# Patient Record
Sex: FEMALE | HISPANIC OR LATINO | URBAN - METROPOLITAN AREA
[De-identification: names, ages, dates, MRNs, and addresses within clinical notes are randomized per-mention and may not be internally consistent; named-entity substitution may affect disease eponyms.]

---

## 2024-02-29 ENCOUNTER — APPOINTMENT (OUTPATIENT)
Dept: RADIOLOGY | Facility: IMAGING CENTER | Age: 29
End: 2024-02-29

## 2024-02-29 DIAGNOSIS — N91.2 AMENORRHEA: ICD-10-CM

## 2024-02-29 PROCEDURE — 76801 OB US < 14 WKS SINGLE FETUS: CPT | Mod: TC

## 2024-03-13 ENCOUNTER — APPOINTMENT (OUTPATIENT)
Dept: OBGYN | Facility: CLINIC | Age: 29
End: 2024-03-13

## 2024-03-13 ENCOUNTER — INITIAL PRENATAL (OUTPATIENT)
Dept: OBGYN | Facility: CLINIC | Age: 29
End: 2024-03-13

## 2024-03-13 ENCOUNTER — HOSPITAL ENCOUNTER (OUTPATIENT)
Facility: MEDICAL CENTER | Age: 29
End: 2024-03-13
Attending: NURSE PRACTITIONER
Payer: COMMERCIAL

## 2024-03-13 VITALS — DIASTOLIC BLOOD PRESSURE: 60 MMHG | SYSTOLIC BLOOD PRESSURE: 110 MMHG | WEIGHT: 133 LBS

## 2024-03-13 DIAGNOSIS — Z87.09 HISTORY OF ASTHMA: ICD-10-CM

## 2024-03-13 DIAGNOSIS — N94.2 VAGINISMUS: ICD-10-CM

## 2024-03-13 PROBLEM — Z34.00 PREGNANCY, SUPERVISION OF FIRST: Status: ACTIVE | Noted: 2024-03-13

## 2024-03-13 PROBLEM — N94.819 VULVODYNIA: Status: ACTIVE | Noted: 2024-03-13

## 2024-03-13 LAB
C TRACH DNA GENITAL QL NAA+PROBE: NEGATIVE
CANDIDA DNA VAG QL PROBE+SIG AMP: NEGATIVE
G VAGINALIS DNA VAG QL PROBE+SIG AMP: NEGATIVE
N GONORRHOEA DNA GENITAL QL NAA+PROBE: NEGATIVE
SPECIMEN SOURCE: NORMAL
T VAGINALIS DNA VAG QL PROBE+SIG AMP: NEGATIVE

## 2024-03-13 PROCEDURE — 0500F INITIAL PRENATAL CARE VISIT: CPT | Performed by: NURSE PRACTITIONER

## 2024-03-13 PROCEDURE — 3074F SYST BP LT 130 MM HG: CPT | Performed by: NURSE PRACTITIONER

## 2024-03-13 PROCEDURE — 3078F DIAST BP <80 MM HG: CPT | Performed by: NURSE PRACTITIONER

## 2024-03-13 PROCEDURE — 8198 PREG CTR PKG RATE (SYSTEM): Performed by: NURSE PRACTITIONER

## 2024-03-13 RX ORDER — PYRIDOXINE HCL (VITAMIN B6) 25 MG
25 TABLET ORAL EVERY 4 HOURS
Qty: 60 TABLET | Refills: 5 | Status: SHIPPED | OUTPATIENT
Start: 2024-03-13

## 2024-03-13 ASSESSMENT — EDINBURGH POSTNATAL DEPRESSION SCALE (EPDS)
I HAVE FELT SCARED OR PANICKY FOR NO GOOD REASON: NO, NOT AT ALL
I HAVE BEEN ABLE TO LAUGH AND SEE THE FUNNY SIDE OF THINGS: AS MUCH AS I ALWAYS COULD
TOTAL SCORE: 1
I HAVE BLAMED MYSELF UNNECESSARILY WHEN THINGS WENT WRONG: NO, NEVER
I HAVE LOOKED FORWARD WITH ENJOYMENT TO THINGS: AS MUCH AS I EVER DID
THINGS HAVE BEEN GETTING ON TOP OF ME: NO, MOST OF THE TIME I HAVE COPED QUITE WELL
I HAVE FELT SAD OR MISERABLE: NO, NOT AT ALL
I HAVE BEEN ANXIOUS OR WORRIED FOR NO GOOD REASON: NO, NOT AT ALL
I HAVE BEEN SO UNHAPPY THAT I HAVE HAD DIFFICULTY SLEEPING: NOT AT ALL
THE THOUGHT OF HARMING MYSELF HAS OCCURRED TO ME: NEVER
I HAVE BEEN SO UNHAPPY THAT I HAVE BEEN CRYING: NO, NEVER

## 2024-03-13 NOTE — PROGRESS NOTES
Subjective:   Susan Coyle is a 29 y.o.  who presents for her new OB exam.  She is 12w2d with an GAIL of Estimated Date of Delivery: 24 by US due to irregular periods. She is feeling well but some nausea. Denies VB, LOF, contractions or pain. No ER visits or previous care in this pregnancy. Denies dysuria, vaginal DC, fever. Reports no fetal movement. Declines AFP.  Declines CF.  Declines NIPT testing.     Past Medical History:   Diagnosis Date    Asthma     Head ache        Psych Hx: Patient denies any history of depression, anxiety, PTSD, bipolar or any other psychological issues.     EPDS completed    History reviewed. No pertinent surgical history.     OB History    Para Term  AB Living   1             SAB IAB Ectopic Molar Multiple Live Births                    # Outcome Date GA Lbr Brayan/2nd Weight Sex Delivery Anes PTL Lv   1 Current                 Gynecological Hx: Denies any hx of STIs, including HSV. Denies any vulvovaginal disorders and no hx of abnormal cervical cytology. Last pap n/a.    Sexual Hx: One current male partner, who is FOB     Contraceptive Hx: Has not used in the past and has since discontinued use.     Family History   Problem Relation Age of Onset    Stomach Cancer Paternal Grandmother      Denies any genetic disorders in family history.     Social History     Socioeconomic History    Marital status: Single     Spouse name: Not on file    Number of children: Not on file    Years of education: Not on file    Highest education level: Not on file   Occupational History    Not on file   Tobacco Use    Smoking status: Never    Smokeless tobacco: Never   Vaping Use    Vaping Use: Never used   Substance and Sexual Activity    Alcohol use: Never    Drug use: Never    Sexual activity: Not Currently     Partners: Male     Birth control/protection: None   Other Topics Concern    Not on file   Social History Narrative    Not on file     Social Determinants of Health      Financial Resource Strain: Not on file   Food Insecurity: Not on file   Transportation Needs: Not on file   Physical Activity: Not on file   Stress: Not on file   Social Connections: Not on file   Intimate Partner Violence: Not on file   Housing Stability: Not on file       FOB is involved and does not lives with Susan Coyle.  She lives with her cousin. Pregnancy is unplanned but desired.    She is currently not working, denies any heavy lifting or exposure to potential teratogens like environmental or occupational toxins.   Denies alcohol use, drug use, or tobacco use in pregnancy.   Denies any current or hx of sexual, emotional or physical abuse or trauma.     Current Medications: PNV   Allergies: Denies allergies to medications, food, or environmental allergies.     Objective:      Vitals:    03/13/24 0835   BP: 110/60   Weight: 133 lb        See Prenatal Physical and Prenatal Vitals      Assessment:      1.  IUP @ 12w2d per US      2.  S=D      3.  See problem list as follows       Patient Active Problem List    Diagnosis Date Noted    Supervision of first pregnancy 03/13/2024         Plan:   - GC/CT/vaginal pathogens collected but pt VERY sensitive to swab on exterior vaginal area, reports first and only time having sex was conceiving this pregnancy; desires pap PP  - B6 and unisom rx sent in  - No more flu vacc in clinic, can get at local pharmacy   - Prenatal labs ordered - lab slip given  - Discussed PNV, nutrition, adequate water intake, and exercise/weight gain in pregnancy  - NOB informational packet with anticipatory guidance given  - Information on Centering Pregnancy given, n/a  - S/sx of pregnancy warning signs and PTL precautions given  - Complete OB US in 8 wks  - Return to [facility] in 4 wks

## 2024-03-13 NOTE — PROGRESS NOTES
Patient here for NEW OB   LMP= 11/8/23 exact   GAIL= 9/23/24  GA= 12w2d  Last pap-  pt would like to do it at her PP appt.   Phone number: [phone] (home)   Pharmacy verified  EPDS- 1  Pt declines genetic testing

## 2024-04-10 ENCOUNTER — ROUTINE PRENATAL (OUTPATIENT)
Dept: OBGYN | Facility: CLINIC | Age: 29
End: 2024-04-10

## 2024-04-10 VITALS
SYSTOLIC BLOOD PRESSURE: 108 MMHG | HEIGHT: 60 IN | BODY MASS INDEX: 26.39 KG/M2 | DIASTOLIC BLOOD PRESSURE: 60 MMHG | WEIGHT: 134.4 LBS

## 2024-04-10 DIAGNOSIS — Z34.03 ENCOUNTER FOR PRENATAL CARE IN THIRD TRIMESTER OF FIRST PREGNANCY: ICD-10-CM

## 2024-04-10 PROCEDURE — 3078F DIAST BP <80 MM HG: CPT | Performed by: PHYSICIAN ASSISTANT

## 2024-04-10 PROCEDURE — 0502F SUBSEQUENT PRENATAL CARE: CPT | Performed by: PHYSICIAN ASSISTANT

## 2024-04-10 PROCEDURE — 3074F SYST BP LT 130 MM HG: CPT | Performed by: PHYSICIAN ASSISTANT

## 2024-04-10 RX ORDER — LANOLIN ALCOHOL/MO/W.PET/CERES
400 CREAM (GRAM) TOPICAL DAILY
Qty: 30 TABLET | Refills: 5 | Status: SHIPPED | OUTPATIENT
Start: 2024-04-10

## 2024-04-10 NOTE — PROGRESS NOTES
Pt. Here for OB/FU.   U/S on 5/8/2024  Good # [phone] (home)   Pt states still having nausea.   Pt states will do PNP today declines genetic testing, wasn't aware she needed to do before, pt would like to know if she could get a prescription for folic acid.

## 2024-04-10 NOTE — LETTER
April 10, 2024            Susan Coyle is pregnant with an estimated delivery date of 9/23/24 at this time.        Thank you,          LOREN Saavedra.

## 2024-04-10 NOTE — PROGRESS NOTES
Pt has no complaint with cramping, bleeding or pain, though pt has continued N/V. Pt states it is difficult to eat or drink water, only gained 1 lb since last visit. +FM but little. GC/CT neg, pt aware, pt was unaware she was supposed to do labs - will do asap. Declines AFP or NIPT.  5/8. Pt given info for OTC N/V remedies, will call if not improving. RTC 4 wk or sooner prn. Info given for WIC.

## 2024-04-16 ENCOUNTER — APPOINTMENT (OUTPATIENT)
Dept: OBGYN | Facility: CLINIC | Age: 29
End: 2024-04-16

## 2024-04-25 ENCOUNTER — HOSPITAL ENCOUNTER (OUTPATIENT)
Dept: LAB | Facility: MEDICAL CENTER | Age: 29
End: 2024-04-25
Attending: NURSE PRACTITIONER
Payer: COMMERCIAL

## 2024-04-25 DIAGNOSIS — Z87.09 HISTORY OF ASTHMA: ICD-10-CM

## 2024-04-25 LAB
ABO GROUP BLD: NORMAL
BLD GP AB SCN SERPL QL: NORMAL
RH BLD: NORMAL

## 2024-04-26 LAB
ERYTHROCYTE [DISTWIDTH] IN BLOOD BY AUTOMATED COUNT: 52.3 FL (ref 35.9–50)
HBV SURFACE AG SER QL: ABNORMAL
HCT VFR BLD AUTO: 40.7 % (ref 37–47)
HCV AB SER QL: ABNORMAL
HGB BLD-MCNC: 13.4 G/DL (ref 12–16)
HIV 1+2 AB+HIV1 P24 AG SERPL QL IA: NORMAL
MCH RBC QN AUTO: 32.8 PG (ref 27–33)
MCHC RBC AUTO-ENTMCNC: 32.9 G/DL (ref 32.2–35.5)
MCV RBC AUTO: 99.8 FL (ref 81.4–97.8)
PLATELET # BLD AUTO: 302 K/UL (ref 164–446)
PMV BLD AUTO: 10.4 FL (ref 9–12.9)
RBC # BLD AUTO: 4.08 M/UL (ref 4.2–5.4)
RUBV AB SER QL: 175 IU/ML
T PALLIDUM AB SER QL IA: ABNORMAL
WBC # BLD AUTO: 9.5 K/UL (ref 4.8–10.8)

## 2024-04-27 LAB
AMPHET CTO UR CFM-MCNC: NEGATIVE NG/ML
BACTERIA UR CULT: NORMAL
BARBITURATES CTO UR CFM-MCNC: NEGATIVE NG/ML
BENZODIAZ CTO UR CFM-MCNC: NEGATIVE NG/ML
CANNABINOIDS CTO UR CFM-MCNC: NEGATIVE NG/ML
COCAINE CTO UR CFM-MCNC: NEGATIVE NG/ML
CREAT UR-MCNC: 124 MG/DL (ref 20–400)
DRUG COMMENT 753798: NORMAL
METHADONE CTO UR CFM-MCNC: NEGATIVE NG/ML
OPIATES CTO UR CFM-MCNC: NEGATIVE NG/ML
PCP CTO UR CFM-MCNC: NEGATIVE NG/ML
PROPOXYPH CTO UR CFM-MCNC: NEGATIVE NG/ML
SIGNIFICANT IND 70042: NORMAL
SITE SITE: NORMAL
SOURCE SOURCE: NORMAL

## 2024-04-30 ENCOUNTER — TELEPHONE (OUTPATIENT)
Dept: MEDICAL GROUP | Facility: IMAGING CENTER | Age: 29
End: 2024-04-30

## 2024-04-30 NOTE — TELEPHONE ENCOUNTER
Caller Name: Susan Coyle   Call Back Number: [phone] (home)      How would the patient prefer to be contacted with a response: Phone call do NOT leave a detailed message    Pt called in regards to the pay check stubs that were requested. She states she was unable to get two paycheck stubs from the month of March. All documents are uploaded under media. She would like a call back. Please advise.   Thank you

## 2024-04-30 NOTE — TELEPHONE ENCOUNTER
Called and let pt know. She had the wrong office and meant to reach nivia bryan. Disregard request. Thank you

## 2024-05-09 ENCOUNTER — APPOINTMENT (OUTPATIENT)
Dept: OBGYN | Facility: CLINIC | Age: 29
End: 2024-05-09

## 2024-05-09 ENCOUNTER — APPOINTMENT (OUTPATIENT)
Dept: RADIOLOGY | Facility: IMAGING CENTER | Age: 29
End: 2024-05-09
Attending: NURSE PRACTITIONER

## 2024-05-09 VITALS — SYSTOLIC BLOOD PRESSURE: 90 MMHG | BODY MASS INDEX: 26.86 KG/M2 | WEIGHT: 136.8 LBS | DIASTOLIC BLOOD PRESSURE: 60 MMHG

## 2024-05-09 DIAGNOSIS — Z34.03 ENCOUNTER FOR PRENATAL CARE IN THIRD TRIMESTER OF FIRST PREGNANCY: ICD-10-CM

## 2024-05-09 DIAGNOSIS — Z87.09 HISTORY OF ASTHMA: ICD-10-CM

## 2024-05-09 PROCEDURE — 3074F SYST BP LT 130 MM HG: CPT | Performed by: PHYSICIAN ASSISTANT

## 2024-05-09 PROCEDURE — 0502F SUBSEQUENT PRENATAL CARE: CPT | Performed by: PHYSICIAN ASSISTANT

## 2024-05-09 PROCEDURE — 76805 OB US >/= 14 WKS SNGL FETUS: CPT | Mod: TC | Performed by: NURSE PRACTITIONER

## 2024-05-09 PROCEDURE — 3078F DIAST BP <80 MM HG: CPT | Performed by: PHYSICIAN ASSISTANT

## 2024-05-09 NOTE — PROGRESS NOTES
Pt. Here for OB/FU. Reports Good FM.   Good # [phone] (home)   U/S done today @ 0900 am.  Pt states still having nausea on occasion.

## 2024-05-09 NOTE — PROGRESS NOTES
Pt hs no complaints with cramping, bleeding or pain. +FM. US done today - wnl, prelim results d/w pt. PNL also wnl - pt aware. 1hr GTT next visit. RTC 4 wk or sooner prn.

## 2024-05-17 PROBLEM — Q27.0 TWO VESSEL CORD: Status: ACTIVE | Noted: 2024-05-17

## 2024-06-03 ENCOUNTER — APPOINTMENT (OUTPATIENT)
Dept: OBGYN | Facility: CLINIC | Age: 29
End: 2024-06-03
Payer: MEDICAID

## 2024-06-10 ENCOUNTER — ROUTINE PRENATAL (OUTPATIENT)
Dept: OBGYN | Facility: CLINIC | Age: 29
End: 2024-06-10

## 2024-06-10 VITALS — SYSTOLIC BLOOD PRESSURE: 102 MMHG | BODY MASS INDEX: 27.88 KG/M2 | DIASTOLIC BLOOD PRESSURE: 61 MMHG | WEIGHT: 142 LBS

## 2024-06-10 DIAGNOSIS — Z34.02 ENCOUNTER FOR SUPERVISION OF NORMAL FIRST PREGNANCY IN SECOND TRIMESTER: ICD-10-CM

## 2024-06-10 PROBLEM — Q27.0 TWO VESSEL CORD: Status: RESOLVED | Noted: 2024-05-17 | Resolved: 2024-06-10

## 2024-06-10 PROCEDURE — 3074F SYST BP LT 130 MM HG: CPT

## 2024-06-10 PROCEDURE — 3078F DIAST BP <80 MM HG: CPT

## 2024-06-10 PROCEDURE — 0502F SUBSEQUENT PRENATAL CARE: CPT

## 2024-06-10 NOTE — PROGRESS NOTES
OB follow up   Pt states no questions or concerns   + fetal movement.  No VB, LOF or UC's.  Phone # [phone]   Preferred pharmacy confirmed.   [id number]     Post-Care Instructions: I reviewed with the patient in detail post-care instructions. Patient is to wear sunprotection, and avoid picking at any of the treated lesions. Pt may apply Vaseline to crusted or scabbing areas. Duration Of Freeze Thaw-Cycle (Seconds): 0 Render Note In Bullet Format When Appropriate: No Consent: The patient's consent was obtained including but not limited to risks of crusting, scabbing, blistering, scarring, darker or lighter pigmentary change, recurrence, incomplete removal and infection. Show Applicator Variable?: Yes Detail Level: Detailed

## 2024-06-10 NOTE — PROGRESS NOTES
S:  Pt is  at 25w0d for routine OB follow up. She has no concerns today. No ED or hospital visits since last seen. Reports good FM. Denies VB, LOF, RUCs or vaginal DC.    O:  See above for vitals        OB US 24 - WNL except 3 vessel view not seen     A:  IUP at 25w0d  Patient Active Problem List    Diagnosis Date Noted    Supervision of first pregnancy 2024    History of childhood asthma 2024    Vulvodynia 2024        P:  1.  Nutrition/diet discussed,         2.  Questions answered.          3.  Encourage adequate water intake.        4.   labor precautions reviewed.         5.  1 hr GTT, CBC and T pallidum ordered. Instructions and lab slip given to pt.        6.  Repeat OB US for 3 vessel view ordered         7.  F/u 3 wks.

## 2024-06-26 ENCOUNTER — HOSPITAL ENCOUNTER (OUTPATIENT)
Dept: LAB | Facility: MEDICAL CENTER | Age: 29
End: 2024-06-26
Payer: COMMERCIAL

## 2024-06-26 DIAGNOSIS — Z34.02 ENCOUNTER FOR SUPERVISION OF NORMAL FIRST PREGNANCY IN SECOND TRIMESTER: ICD-10-CM

## 2024-06-26 LAB
BASOPHILS # BLD AUTO: 0.6 % (ref 0–1.8)
BASOPHILS # BLD: 0.06 K/UL (ref 0–0.12)
EOSINOPHIL # BLD AUTO: 0.2 K/UL (ref 0–0.51)
EOSINOPHIL NFR BLD: 1.9 % (ref 0–6.9)
ERYTHROCYTE [DISTWIDTH] IN BLOOD BY AUTOMATED COUNT: 55.4 FL (ref 35.9–50)
GLUCOSE 1H P 50 G GLC PO SERPL-MCNC: 119 MG/DL (ref 70–139)
HCT VFR BLD AUTO: 40 % (ref 37–47)
HGB BLD-MCNC: 13 G/DL (ref 12–16)
IMM GRANULOCYTES # BLD AUTO: 0.13 K/UL (ref 0–0.11)
IMM GRANULOCYTES NFR BLD AUTO: 1.2 % (ref 0–0.9)
LYMPHOCYTES # BLD AUTO: 2.92 K/UL (ref 1–4.8)
LYMPHOCYTES NFR BLD: 27.4 % (ref 22–41)
MCH RBC QN AUTO: 33.1 PG (ref 27–33)
MCHC RBC AUTO-ENTMCNC: 32.5 G/DL (ref 32.2–35.5)
MCV RBC AUTO: 101.8 FL (ref 81.4–97.8)
MONOCYTES # BLD AUTO: 0.7 K/UL (ref 0–0.85)
MONOCYTES NFR BLD AUTO: 6.6 % (ref 0–13.4)
NEUTROPHILS # BLD AUTO: 6.64 K/UL (ref 1.82–7.42)
NEUTROPHILS NFR BLD: 62.3 % (ref 44–72)
NRBC # BLD AUTO: 0 K/UL
NRBC BLD-RTO: 0 /100 WBC (ref 0–0.2)
PLATELET # BLD AUTO: 255 K/UL (ref 164–446)
PMV BLD AUTO: 10.7 FL (ref 9–12.9)
RBC # BLD AUTO: 3.93 M/UL (ref 4.2–5.4)
T PALLIDUM AB SER QL IA: NORMAL
WBC # BLD AUTO: 10.7 K/UL (ref 4.8–10.8)

## 2024-07-08 ENCOUNTER — ROUTINE PRENATAL (OUTPATIENT)
Dept: OBGYN | Facility: CLINIC | Age: 29
End: 2024-07-08

## 2024-07-08 VITALS — WEIGHT: 144.2 LBS | SYSTOLIC BLOOD PRESSURE: 104 MMHG | BODY MASS INDEX: 28.31 KG/M2 | DIASTOLIC BLOOD PRESSURE: 64 MMHG

## 2024-07-08 DIAGNOSIS — Z34.03 ENCOUNTER FOR SUPERVISION OF NORMAL FIRST PREGNANCY IN THIRD TRIMESTER: ICD-10-CM

## 2024-07-08 DIAGNOSIS — Z3A.29 29 WEEKS GESTATION OF PREGNANCY: ICD-10-CM

## 2024-07-08 PROCEDURE — 0502F SUBSEQUENT PRENATAL CARE: CPT | Performed by: OBSTETRICS & GYNECOLOGY

## 2024-07-08 PROCEDURE — 3078F DIAST BP <80 MM HG: CPT | Performed by: OBSTETRICS & GYNECOLOGY

## 2024-07-08 PROCEDURE — 3074F SYST BP LT 130 MM HG: CPT | Performed by: OBSTETRICS & GYNECOLOGY

## 2024-07-22 ENCOUNTER — ROUTINE PRENATAL (OUTPATIENT)
Dept: OBGYN | Facility: CLINIC | Age: 29
End: 2024-07-22

## 2024-07-22 VITALS — SYSTOLIC BLOOD PRESSURE: 100 MMHG | WEIGHT: 147 LBS | DIASTOLIC BLOOD PRESSURE: 60 MMHG | BODY MASS INDEX: 28.86 KG/M2

## 2024-07-22 DIAGNOSIS — Z34.03 ENCOUNTER FOR PRENATAL CARE IN THIRD TRIMESTER OF FIRST PREGNANCY: ICD-10-CM

## 2024-07-22 PROCEDURE — 0502F SUBSEQUENT PRENATAL CARE: CPT

## 2024-07-22 PROCEDURE — 3078F DIAST BP <80 MM HG: CPT

## 2024-07-22 PROCEDURE — 3074F SYST BP LT 130 MM HG: CPT

## 2024-08-12 ENCOUNTER — ROUTINE PRENATAL (OUTPATIENT)
Dept: OBGYN | Facility: CLINIC | Age: 29
End: 2024-08-12

## 2024-08-12 VITALS — SYSTOLIC BLOOD PRESSURE: 100 MMHG | BODY MASS INDEX: 29.45 KG/M2 | DIASTOLIC BLOOD PRESSURE: 71 MMHG | WEIGHT: 150 LBS

## 2024-08-12 DIAGNOSIS — Z34.03 ENCOUNTER FOR SUPERVISION OF NORMAL FIRST PREGNANCY IN THIRD TRIMESTER: ICD-10-CM

## 2024-08-12 PROCEDURE — 0502F SUBSEQUENT PRENATAL CARE: CPT

## 2024-08-12 PROCEDURE — 3074F SYST BP LT 130 MM HG: CPT

## 2024-08-12 PROCEDURE — 3078F DIAST BP <80 MM HG: CPT

## 2024-08-12 NOTE — PROGRESS NOTES
Inserted through 6F standard sheath Pt here for a OB follow up   GA: 34w 0d  Pt states: feet are swelling   + fetal movement.  No VB, LOF, UC's.  Phone # [phone]   Pharmacy Verified.    : [id number]

## 2024-08-29 ENCOUNTER — ROUTINE PRENATAL (OUTPATIENT)
Dept: OBGYN | Facility: CLINIC | Age: 29
End: 2024-08-29
Payer: MEDICAID

## 2024-08-29 ENCOUNTER — HOSPITAL ENCOUNTER (OUTPATIENT)
Facility: MEDICAL CENTER | Age: 29
End: 2024-08-29
Attending: STUDENT IN AN ORGANIZED HEALTH CARE EDUCATION/TRAINING PROGRAM
Payer: MEDICAID

## 2024-08-29 VITALS — SYSTOLIC BLOOD PRESSURE: 136 MMHG | WEIGHT: 154.2 LBS | DIASTOLIC BLOOD PRESSURE: 82 MMHG | BODY MASS INDEX: 30.27 KG/M2

## 2024-08-29 DIAGNOSIS — Z34.03 NORMAL FIRST PREGNANCY CONFIRMED, CURRENTLY IN THIRD TRIMESTER: ICD-10-CM

## 2024-08-29 DIAGNOSIS — Z3A.36 36 WEEKS GESTATION OF PREGNANCY: ICD-10-CM

## 2024-08-29 PROCEDURE — 87081 CULTURE SCREEN ONLY: CPT

## 2024-08-29 PROCEDURE — 87150 DNA/RNA AMPLIFIED PROBE: CPT

## 2024-08-29 NOTE — PROGRESS NOTES
OB Visit Note - 36w3d     Pregnancy complicated by:  Patient Active Problem List   Diagnosis    Supervision of first pregnancy    History of childhood asthma    Vulvodynia         SUBJECTIVE:  Susan Coyle is a 29 y.o.,  at 36w3d who presents for pregnancy follow-up. She states the baby is moving.     ROS:  She denies vaginal bleeding, leakage of fluid, or contractions.    She denies nausea or vomiting or headache    OBJECTIVE:  Vital Signs: /82   Wt 154 lb 3.2 oz   LMP 2023 (Approximate)   BMI 30.27 kg/m²   Appearance/Psychiatric: to be in no distress  Constitutional: The patient is well nourished.  Respiratory:Respiratory effort is normal.  Gastrointestinal: Abdomen is soft, gravid, non-tendern,no rashes, heart tones are present, fundal height is consistent with dates  Extremities: no edema  Limited TAUS: cephalic  FH 37    GBS collected    1. Normal first pregnancy confirmed, currently in third trimester          Susan Coyle is a 29 y.o. female  at 36w3d. Doing well. Cephalic by US. GBS collected      The patient will follow up in 1 week(s). She was counseled to call or return for vaginal bleeding, regular contractions, leakage of fluid or decreased fetal movement.  Stacie Jules DO, FACOG  [facility]

## 2024-08-29 NOTE — PROGRESS NOTES
Pt's here for OB follow-up  Reports + fetal movement good/active  No VB, LOF or UC's.  Confirmed good ph#[phone]    Pharmacy, drug allergy, and medications on file.  Pt states no complaints for today.  GBS Today  Declines TDAP today

## 2024-08-30 LAB — GP B STREP DNA SPEC QL NAA+PROBE: NEGATIVE

## 2024-09-12 ENCOUNTER — ROUTINE PRENATAL (OUTPATIENT)
Dept: OBGYN | Facility: CLINIC | Age: 29
End: 2024-09-12

## 2024-09-12 VITALS — SYSTOLIC BLOOD PRESSURE: 122 MMHG | BODY MASS INDEX: 31.73 KG/M2 | DIASTOLIC BLOOD PRESSURE: 80 MMHG | WEIGHT: 161.6 LBS

## 2024-09-12 DIAGNOSIS — Z34.03 ENCOUNTER FOR PRENATAL CARE IN THIRD TRIMESTER OF FIRST PREGNANCY: ICD-10-CM

## 2024-09-12 PROCEDURE — 3079F DIAST BP 80-89 MM HG: CPT | Performed by: PHYSICIAN ASSISTANT

## 2024-09-12 PROCEDURE — 0502F SUBSEQUENT PRENATAL CARE: CPT | Performed by: PHYSICIAN ASSISTANT

## 2024-09-12 PROCEDURE — 3074F SYST BP LT 130 MM HG: CPT | Performed by: PHYSICIAN ASSISTANT

## 2024-09-12 NOTE — PROGRESS NOTES
Pt has no complaints with cramping, UCs, VB, LOF. +FM. GBS neg. Labor precautions reviewed. US confirms vtx. IOL next visit if not in labor. Pt declines cervical exam today. RTC 1 wk or sooner prn.

## 2024-09-12 NOTE — PROGRESS NOTES
Pt. Here for OB/FU. Reports Good FM.   Good # [phone] (home)   Pt states has been having cramping that comes and goes.   GBS negative

## 2024-09-20 ENCOUNTER — ROUTINE PRENATAL (OUTPATIENT)
Dept: OBGYN | Facility: CLINIC | Age: 29
End: 2024-09-20

## 2024-09-20 ENCOUNTER — HOSPITAL ENCOUNTER (OUTPATIENT)
Dept: LAB | Facility: MEDICAL CENTER | Age: 29
End: 2024-09-20
Payer: COMMERCIAL

## 2024-09-20 ENCOUNTER — HOSPITAL ENCOUNTER (INPATIENT)
Facility: MEDICAL CENTER | Age: 29
LOS: 3 days | End: 2024-09-23
Attending: OBSTETRICS & GYNECOLOGY | Admitting: OBSTETRICS & GYNECOLOGY
Payer: MEDICAID

## 2024-09-20 VITALS — BODY MASS INDEX: 31.8 KG/M2 | SYSTOLIC BLOOD PRESSURE: 132 MMHG | WEIGHT: 162 LBS | DIASTOLIC BLOOD PRESSURE: 90 MMHG

## 2024-09-20 DIAGNOSIS — O16.3 ELEVATED BLOOD PRESSURE AFFECTING PREGNANCY IN THIRD TRIMESTER, ANTEPARTUM: ICD-10-CM

## 2024-09-20 DIAGNOSIS — Z34.03 ENCOUNTER FOR PRENATAL CARE IN THIRD TRIMESTER OF FIRST PREGNANCY: ICD-10-CM

## 2024-09-20 PROBLEM — Z34.90 ENCOUNTER FOR INDUCTION OF LABOR: Status: ACTIVE | Noted: 2024-09-20

## 2024-09-20 LAB
ALBUMIN SERPL BCP-MCNC: 3.5 G/DL (ref 3.2–4.9)
ALBUMIN/GLOB SERPL: 1.1 G/DL
ALP SERPL-CCNC: 123 U/L (ref 30–99)
ALT SERPL-CCNC: 11 U/L (ref 2–50)
ANION GAP SERPL CALC-SCNC: 12 MMOL/L (ref 7–16)
AST SERPL-CCNC: 24 U/L (ref 12–45)
BILIRUB SERPL-MCNC: 0.2 MG/DL (ref 0.1–1.5)
BUN SERPL-MCNC: 11 MG/DL (ref 8–22)
CALCIUM ALBUM COR SERPL-MCNC: 9.4 MG/DL (ref 8.5–10.5)
CALCIUM SERPL-MCNC: 9 MG/DL (ref 8.5–10.5)
CHLORIDE SERPL-SCNC: 103 MMOL/L (ref 96–112)
CO2 SERPL-SCNC: 20 MMOL/L (ref 20–33)
CREAT SERPL-MCNC: 0.64 MG/DL (ref 0.5–1.4)
CREAT UR-MCNC: 174 MG/DL
GFR SERPLBLD CREATININE-BSD FMLA CKD-EPI: 121 ML/MIN/1.73 M 2
GLOBULIN SER CALC-MCNC: 3.2 G/DL (ref 1.9–3.5)
GLUCOSE SERPL-MCNC: 90 MG/DL (ref 65–99)
LDH SERPL L TO P-CCNC: 229 U/L (ref 107–266)
POTASSIUM SERPL-SCNC: 4.2 MMOL/L (ref 3.6–5.5)
PROT SERPL-MCNC: 6.7 G/DL (ref 6–8.2)
PROT UR-MCNC: 67.1 MG/DL (ref 0–15)
PROT/CREAT UR: 386 MG/G (ref 10–107)
SODIUM SERPL-SCNC: 135 MMOL/L (ref 135–145)

## 2024-09-20 PROCEDURE — 700105 HCHG RX REV CODE 258: Performed by: OBSTETRICS & GYNECOLOGY

## 2024-09-20 PROCEDURE — 0502F SUBSEQUENT PRENATAL CARE: CPT

## 2024-09-20 PROCEDURE — 3075F SYST BP GE 130 - 139MM HG: CPT

## 2024-09-20 PROCEDURE — A9270 NON-COVERED ITEM OR SERVICE: HCPCS | Performed by: NURSE PRACTITIONER

## 2024-09-20 PROCEDURE — 700111 HCHG RX REV CODE 636 W/ 250 OVERRIDE (IP): Performed by: OBSTETRICS & GYNECOLOGY

## 2024-09-20 PROCEDURE — 700102 HCHG RX REV CODE 250 W/ 637 OVERRIDE(OP): Performed by: NURSE PRACTITIONER

## 2024-09-20 PROCEDURE — 36415 COLL VENOUS BLD VENIPUNCTURE: CPT

## 2024-09-20 PROCEDURE — 770002 HCHG ROOM/CARE - OB PRIVATE (112)

## 2024-09-20 PROCEDURE — 3080F DIAST BP >= 90 MM HG: CPT

## 2024-09-20 RX ORDER — ACETAMINOPHEN 500 MG
1000 TABLET ORAL
Status: DISCONTINUED | OUTPATIENT
Start: 2024-09-20 | End: 2024-09-21 | Stop reason: HOSPADM

## 2024-09-20 RX ORDER — LIDOCAINE HYDROCHLORIDE 10 MG/ML
20 INJECTION, SOLUTION INFILTRATION; PERINEURAL
Status: DISCONTINUED | OUTPATIENT
Start: 2024-09-20 | End: 2024-09-21 | Stop reason: HOSPADM

## 2024-09-20 RX ORDER — TERBUTALINE SULFATE 1 MG/ML
0.25 INJECTION, SOLUTION SUBCUTANEOUS
Status: DISCONTINUED | OUTPATIENT
Start: 2024-09-20 | End: 2024-09-21 | Stop reason: HOSPADM

## 2024-09-20 RX ORDER — ACETAMINOPHEN 500 MG
1000 TABLET ORAL EVERY 6 HOURS PRN
Status: DISCONTINUED | OUTPATIENT
Start: 2024-09-20 | End: 2024-09-21

## 2024-09-20 RX ORDER — IBUPROFEN 800 MG/1
800 TABLET, FILM COATED ORAL
Status: DISCONTINUED | OUTPATIENT
Start: 2024-09-20 | End: 2024-09-21 | Stop reason: HOSPADM

## 2024-09-20 RX ORDER — OXYTOCIN 10 [USP'U]/ML
10 INJECTION, SOLUTION INTRAMUSCULAR; INTRAVENOUS
Status: DISCONTINUED | OUTPATIENT
Start: 2024-09-20 | End: 2024-09-21 | Stop reason: HOSPADM

## 2024-09-20 RX ORDER — SODIUM CHLORIDE, SODIUM LACTATE, POTASSIUM CHLORIDE, CALCIUM CHLORIDE 600; 310; 30; 20 MG/100ML; MG/100ML; MG/100ML; MG/100ML
INJECTION, SOLUTION INTRAVENOUS CONTINUOUS
Status: DISCONTINUED | OUTPATIENT
Start: 2024-09-20 | End: 2024-09-21 | Stop reason: ALTCHOICE

## 2024-09-20 RX ADMIN — ACETAMINOPHEN 1000 MG: 500 TABLET ORAL at 20:51

## 2024-09-20 RX ADMIN — OXYTOCIN 2 MILLI-UNITS/MIN: 10 INJECTION, SOLUTION INTRAMUSCULAR; INTRAVENOUS at 20:18

## 2024-09-20 RX ADMIN — SODIUM CHLORIDE, POTASSIUM CHLORIDE, SODIUM LACTATE AND CALCIUM CHLORIDE: 600; 310; 30; 20 INJECTION, SOLUTION INTRAVENOUS at 20:17

## 2024-09-20 SDOH — ECONOMIC STABILITY: TRANSPORTATION INSECURITY
IN THE PAST 12 MONTHS, HAS THE LACK OF TRANSPORTATION KEPT YOU FROM MEDICAL APPOINTMENTS OR FROM GETTING MEDICATIONS?: NO

## 2024-09-20 SDOH — ECONOMIC STABILITY: TRANSPORTATION INSECURITY
IN THE PAST 12 MONTHS, HAS LACK OF RELIABLE TRANSPORTATION KEPT YOU FROM MEDICAL APPOINTMENTS, MEETINGS, WORK OR FROM GETTING THINGS NEEDED FOR DAILY LIVING?: NO

## 2024-09-20 ASSESSMENT — LIFESTYLE VARIABLES
AVERAGE NUMBER OF DAYS PER WEEK YOU HAVE A DRINK CONTAINING ALCOHOL: 0
HOW MANY TIMES IN THE PAST YEAR HAVE YOU HAD 5 OR MORE DRINKS IN A DAY: 0
ALCOHOL_USE: NO
DOES PATIENT WANT TO STOP DRINKING: NO
TOTAL SCORE: 0
HAVE YOU EVER FELT YOU SHOULD CUT DOWN ON YOUR DRINKING: NO
EVER HAD A DRINK FIRST THING IN THE MORNING TO STEADY YOUR NERVES TO GET RID OF A HANGOVER: NO
CONSUMPTION TOTAL: NEGATIVE
ON A TYPICAL DAY WHEN YOU DRINK ALCOHOL HOW MANY DRINKS DO YOU HAVE: 0
HAVE PEOPLE ANNOYED YOU BY CRITICIZING YOUR DRINKING: NO
TOTAL SCORE: 0
TOTAL SCORE: 0
EVER FELT BAD OR GUILTY ABOUT YOUR DRINKING: NO

## 2024-09-20 ASSESSMENT — SOCIAL DETERMINANTS OF HEALTH (SDOH)
WITHIN THE PAST 12 MONTHS, YOU WORRIED THAT YOUR FOOD WOULD RUN OUT BEFORE YOU GOT THE MONEY TO BUY MORE: NEVER TRUE
WITHIN THE LAST YEAR, HAVE YOU BEEN AFRAID OF YOUR PARTNER OR EX-PARTNER?: NO
IN THE PAST 12 MONTHS, HAS THE ELECTRIC, GAS, OIL, OR WATER COMPANY THREATENED TO SHUT OFF SERVICE IN YOUR HOME?: NO
WITHIN THE LAST YEAR, HAVE YOU BEEN HUMILIATED OR EMOTIONALLY ABUSED IN OTHER WAYS BY YOUR PARTNER OR EX-PARTNER?: NO
WITHIN THE PAST 12 MONTHS, THE FOOD YOU BOUGHT JUST DIDN'T LAST AND YOU DIDN'T HAVE MONEY TO GET MORE: NEVER TRUE
WITHIN THE LAST YEAR, HAVE YOU BEEN KICKED, HIT, SLAPPED, OR OTHERWISE PHYSICALLY HURT BY YOUR PARTNER OR EX-PARTNER?: NO
WITHIN THE LAST YEAR, HAVE TO BEEN RAPED OR FORCED TO HAVE ANY KIND OF SEXUAL ACTIVITY BY YOUR PARTNER OR EX-PARTNER?: NO

## 2024-09-20 ASSESSMENT — PATIENT HEALTH QUESTIONNAIRE - PHQ9
2. FEELING DOWN, DEPRESSED, IRRITABLE, OR HOPELESS: NOT AT ALL
1. LITTLE INTEREST OR PLEASURE IN DOING THINGS: NOT AT ALL
SUM OF ALL RESPONSES TO PHQ9 QUESTIONS 1 AND 2: 0

## 2024-09-20 ASSESSMENT — PAIN DESCRIPTION - PAIN TYPE: TYPE: ACUTE PAIN

## 2024-09-20 ASSESSMENT — FIBROSIS 4 INDEX: FIB4 SCORE: 0.88

## 2024-09-20 NOTE — PROGRESS NOTES
S: Pt is a 29 y.o.  at 39w4d gestation here today for routine prenatal care. Pt reports fetal movement; denies cramping, vaginal bleeding, and leaking of fluid. Denies HA, blurry vision, or RUQ pain. Does not want SVE today.    O: BP (!) 132/90   Wt 162 lb    *see prenatal flowsheet*     Repeat BP: 132/90    A: IUP at 39w4d       S=D         Patient Active Problem List    Diagnosis Date Noted    Supervision of first pregnancy 2024    History of childhood asthma 2024    Vulvodynia 2024       P:   -Discussed normal s/sx pregnancy appropriate for gestational age general discomforts vs warning signs that necessitate evaluation in OB triage. Reviewed fetal movements appropriate for EGA. Reinforced adequate hydration and nutrition.  -Discussed signs and symptoms of preeclampsia, including HA that is not resolved by rest/Tylenol/hydration, changes in vision, RUQ pain, or sudden increase in swelling.   -Discussed labor precautions, including 5-1-1 rule. Instructed to report to hospital with leaking of fluid, concerns for decreased FM, excessive vaginal bleeding.   -Labs ordered today: CMP, PCR, LDH ordered STAT. Pt returned to clinic and repeat BP was normal (136/86)--will call with lab results if f/u is indicated.   -RTC in 1 weeks for routine prenatal care      Bruna Kay C.N.M.        1300: Labs resulted, PCR elevated and pt has dx of preeclampsia. Pt called, instructed to report to L&D for IOL today. Pt v/u and report called to MD Gabriele and charge RN.

## 2024-09-20 NOTE — H&P
OB H&P:    CC: IOL, Preeclampsia w/out SF    HPI:  Ms. Susan Coyle is a 29 y.o.  @ 39w4d by 12w US is admitted for induction of labor due to preeclampsia without severe features. BP in the office was 130s/90s, PCR was 386    Contractions: No   Loss of fluid: No   Vaginal bleeding: No   Fetal movement: present    She reports asthma in childhood but has never used an inhaler    She denies HA, scotoma, CP, SOB, RUQ pain. Endorses swelling of her feet that started the last couple days.     Prenatal Care with: [facility]    Complications this pregnancy:  -PreEclampsia without severe features- diagnosed today,   -Vulvodynia- per chart, this pregnancy was the result of a single sexual encounter, she has never used tampons or had other vaginal penetration. She has not had a cervical exam done before    Prenatal Labs:  Rh+, Rubella immune, HIV neg, TrepAb neg, HBsAg NR, GC/CT neg/neg  Glucola: 119  GBS Negative      ROS:  Const: denies fevers, general concerns  CV/resp: reports no concerns  GI: denies abd pain, GI concerns  : see HPI  Neuro: denies HA/vision changes    OB History    Para Term  AB Living   1             SAB IAB Ectopic Molar Multiple Live Births                    # Outcome Date GA Lbr Brayan/2nd Weight Sex Type Anes PTL Lv   1 Current                GYN: denies STIs, no cervical procedures    No past medical history on file.    No past surgical history on file.    No current facility-administered medications on file prior to encounter.     Current Outpatient Medications on File Prior to Encounter   Medication Sig Dispense Refill    Prenatal Multivit-Min-Fe-FA (PRENATAL 1 + IRON PO) Take  by mouth.      doxylamine (UNISOM) 25 MG Tab tablet Take 0.5 Tablets by mouth at bedtime. (Patient not taking: Reported on 4/10/2024) 30 Tablet 5    pyridoxine (VITAMIN B-6) 25 MG Tab Take 1 Tablet by mouth every 4 hours. (Patient not taking: Reported on 2024) 60 Tablet 5        Family History   Problem Relation Age of Onset    Stomach Cancer Paternal Grandmother        Social History     Tobacco Use    Smoking status: Never    Smokeless tobacco: Never   Vaping Use    Vaping status: Never Used   Substance Use Topics    Alcohol use: Never    Drug use: Never         PE:   There were no vitals filed for this visit.  gen: AAO, NAD  abd: soft, gravid, NT, EFW 36% by 20w US  Ext: NT, 1+ edema    SVE: unable to complete at this time  FHT: baseline 160/moderate variability/+ accels/ - decels  pamella:    A/P: 29 y.o.  @ 39w4d by 12w US is admitted for induction of labor due to preeclampsia without severe features.    Admit to labor and delivery  Continuous external fetal monitoring with tocometer  Plan for epidural for pain management when patient is ready  Continue to monitor BP, interventions as guideline directed  Dr. Suazo to discuss induction plan at this time.    Twila Askew, DO  [facility]  PGY-1

## 2024-09-20 NOTE — PROGRESS NOTES
Pt here today for OBFV   +FM movemnet  No VB, LOF. 's   Phone number [phone] (home)   Pharmacy verified   GBS- neg   Cervical check- no

## 2024-09-21 ENCOUNTER — ANESTHESIA EVENT (OUTPATIENT)
Dept: OBGYN | Facility: MEDICAL CENTER | Age: 29
End: 2024-09-21
Payer: MEDICAID

## 2024-09-21 ENCOUNTER — ANESTHESIA (OUTPATIENT)
Dept: OBGYN | Facility: MEDICAL CENTER | Age: 29
End: 2024-09-21
Payer: MEDICAID

## 2024-09-21 LAB
ABO GROUP BLD: NORMAL
BARCODED ABORH UBTYP: 9500
BARCODED ABORH UBTYP: 9500
BARCODED PRD CODE UBPRD: NORMAL
BARCODED PRD CODE UBPRD: NORMAL
BARCODED UNIT NUM UBUNT: NORMAL
BARCODED UNIT NUM UBUNT: NORMAL
BASOPHILS # BLD AUTO: 0.3 % (ref 0–1.8)
BASOPHILS # BLD AUTO: 0.5 % (ref 0–1.8)
BASOPHILS # BLD: 0.04 K/UL (ref 0–0.12)
BASOPHILS # BLD: 0.06 K/UL (ref 0–0.12)
BLD GP AB SCN SERPL QL: NORMAL
COMPONENT R 8504R: NORMAL
COMPONENT R 8504R: NORMAL
EOSINOPHIL # BLD AUTO: 0.02 K/UL (ref 0–0.51)
EOSINOPHIL # BLD AUTO: 0.03 K/UL (ref 0–0.51)
EOSINOPHIL NFR BLD: 0.1 % (ref 0–6.9)
EOSINOPHIL NFR BLD: 0.4 % (ref 0–6.9)
ERYTHROCYTE [DISTWIDTH] IN BLOOD BY AUTOMATED COUNT: 53.1 FL (ref 35.9–50)
ERYTHROCYTE [DISTWIDTH] IN BLOOD BY AUTOMATED COUNT: 55.9 FL (ref 35.9–50)
FIBRINOGEN PPP-MCNC: 286 MG/DL (ref 215–460)
HCT VFR BLD AUTO: 38.1 % (ref 37–47)
HCT VFR BLD AUTO: 39.3 % (ref 37–47)
HGB BLD-MCNC: 12.9 G/DL (ref 12–16)
HGB BLD-MCNC: 13 G/DL (ref 12–16)
HOLDING TUBE BB 8507: NORMAL
IMM GRANULOCYTES # BLD AUTO: 0.04 K/UL (ref 0–0.11)
IMM GRANULOCYTES # BLD AUTO: 0.27 K/UL (ref 0–0.11)
IMM GRANULOCYTES NFR BLD AUTO: 0.5 % (ref 0–0.9)
IMM GRANULOCYTES NFR BLD AUTO: 1.1 % (ref 0–0.9)
INR PPP: 1.18 (ref 0.87–1.13)
LYMPHOCYTES # BLD AUTO: 2.3 K/UL (ref 1–4.8)
LYMPHOCYTES # BLD AUTO: 2.58 K/UL (ref 1–4.8)
LYMPHOCYTES NFR BLD: 35.3 % (ref 22–41)
LYMPHOCYTES NFR BLD: 9.8 % (ref 22–41)
MCH RBC QN AUTO: 31.7 PG (ref 27–33)
MCH RBC QN AUTO: 31.9 PG (ref 27–33)
MCHC RBC AUTO-ENTMCNC: 33.1 G/DL (ref 32.2–35.5)
MCHC RBC AUTO-ENTMCNC: 33.9 G/DL (ref 32.2–35.5)
MCV RBC AUTO: 94.1 FL (ref 81.4–97.8)
MCV RBC AUTO: 95.9 FL (ref 81.4–97.8)
MONOCYTES # BLD AUTO: 0.52 K/UL (ref 0–0.85)
MONOCYTES # BLD AUTO: 1.06 K/UL (ref 0–0.85)
MONOCYTES NFR BLD AUTO: 4.5 % (ref 0–13.4)
MONOCYTES NFR BLD AUTO: 7.1 % (ref 0–13.4)
NEUTROPHILS # BLD AUTO: 19.8 K/UL (ref 1.82–7.42)
NEUTROPHILS # BLD AUTO: 4.09 K/UL (ref 1.82–7.42)
NEUTROPHILS NFR BLD: 56.2 % (ref 44–72)
NEUTROPHILS NFR BLD: 84.2 % (ref 44–72)
NRBC # BLD AUTO: 0 K/UL
NRBC # BLD AUTO: 0.02 K/UL
NRBC BLD-RTO: 0 /100 WBC (ref 0–0.2)
NRBC BLD-RTO: 0.1 /100 WBC (ref 0–0.2)
PLATELET # BLD AUTO: 173 K/UL (ref 164–446)
PLATELET # BLD AUTO: 187 K/UL (ref 164–446)
PMV BLD AUTO: 10.7 FL (ref 9–12.9)
PMV BLD AUTO: 11.3 FL (ref 9–12.9)
PRODUCT TYPE UPROD: NORMAL
PRODUCT TYPE UPROD: NORMAL
PROTHROMBIN TIME: 15.1 SEC (ref 12–14.6)
RBC # BLD AUTO: 4.05 M/UL (ref 4.2–5.4)
RBC # BLD AUTO: 4.1 M/UL (ref 4.2–5.4)
RH BLD: NORMAL
T PALLIDUM AB SER QL IA: NONREACTIVE
UNIT STATUS USTAT: NORMAL
UNIT STATUS USTAT: NORMAL
WBC # BLD AUTO: 23.5 K/UL (ref 4.8–10.8)
WBC # BLD AUTO: 7.3 K/UL (ref 4.8–10.8)

## 2024-09-21 PROCEDURE — C1755 CATHETER, INTRASPINAL: HCPCS | Performed by: OBSTETRICS & GYNECOLOGY

## 2024-09-21 PROCEDURE — 85025 COMPLETE CBC W/AUTO DIFF WBC: CPT

## 2024-09-21 PROCEDURE — 700111 HCHG RX REV CODE 636 W/ 250 OVERRIDE (IP): Performed by: STUDENT IN AN ORGANIZED HEALTH CARE EDUCATION/TRAINING PROGRAM

## 2024-09-21 PROCEDURE — 85384 FIBRINOGEN ACTIVITY: CPT

## 2024-09-21 PROCEDURE — 85027 COMPLETE CBC AUTOMATED: CPT

## 2024-09-21 PROCEDURE — 86850 RBC ANTIBODY SCREEN: CPT

## 2024-09-21 PROCEDURE — 700111 HCHG RX REV CODE 636 W/ 250 OVERRIDE (IP): Mod: JZ | Performed by: OBSTETRICS & GYNECOLOGY

## 2024-09-21 PROCEDURE — 700105 HCHG RX REV CODE 258: Performed by: OBSTETRICS & GYNECOLOGY

## 2024-09-21 PROCEDURE — 59899 UNLISTED PX MAT CARE&DLVR: CPT | Performed by: OBSTETRICS & GYNECOLOGY

## 2024-09-21 PROCEDURE — 160025 RECOVERY II MINUTES (STATS): Performed by: OBSTETRICS & GYNECOLOGY

## 2024-09-21 PROCEDURE — A9270 NON-COVERED ITEM OR SERVICE: HCPCS | Performed by: STUDENT IN AN ORGANIZED HEALTH CARE EDUCATION/TRAINING PROGRAM

## 2024-09-21 PROCEDURE — 86901 BLOOD TYPING SEROLOGIC RH(D): CPT

## 2024-09-21 PROCEDURE — 700105 HCHG RX REV CODE 258: Performed by: STUDENT IN AN ORGANIZED HEALTH CARE EDUCATION/TRAINING PROGRAM

## 2024-09-21 PROCEDURE — 700102 HCHG RX REV CODE 250 W/ 637 OVERRIDE(OP): Performed by: STUDENT IN AN ORGANIZED HEALTH CARE EDUCATION/TRAINING PROGRAM

## 2024-09-21 PROCEDURE — 160002 HCHG RECOVERY MINUTES (STAT): Performed by: OBSTETRICS & GYNECOLOGY

## 2024-09-21 PROCEDURE — 88307 TISSUE EXAM BY PATHOLOGIST: CPT

## 2024-09-21 PROCEDURE — 36415 COLL VENOUS BLD VENIPUNCTURE: CPT

## 2024-09-21 PROCEDURE — 700111 HCHG RX REV CODE 636 W/ 250 OVERRIDE (IP): Mod: JZ | Performed by: STUDENT IN AN ORGANIZED HEALTH CARE EDUCATION/TRAINING PROGRAM

## 2024-09-21 PROCEDURE — 700111 HCHG RX REV CODE 636 W/ 250 OVERRIDE (IP): Mod: JZ

## 2024-09-21 PROCEDURE — 700101 HCHG RX REV CODE 250: Performed by: STUDENT IN AN ORGANIZED HEALTH CARE EDUCATION/TRAINING PROGRAM

## 2024-09-21 PROCEDURE — 770002 HCHG ROOM/CARE - OB PRIVATE (112)

## 2024-09-21 PROCEDURE — 10907ZC DRAINAGE OF AMNIOTIC FLUID, THERAPEUTIC FROM PRODUCTS OF CONCEPTION, VIA NATURAL OR ARTIFICIAL OPENING: ICD-10-PCS | Performed by: OBSTETRICS & GYNECOLOGY

## 2024-09-21 PROCEDURE — 160041 HCHG SURGERY MINUTES - EA ADDL 1 MIN LEVEL 4: Performed by: OBSTETRICS & GYNECOLOGY

## 2024-09-21 PROCEDURE — 160009 HCHG ANES TIME/MIN: Performed by: OBSTETRICS & GYNECOLOGY

## 2024-09-21 PROCEDURE — 10H07YZ INSERTION OF OTHER DEVICE INTO PRODUCTS OF CONCEPTION, VIA NATURAL OR ARTIFICIAL OPENING: ICD-10-PCS | Performed by: OBSTETRICS & GYNECOLOGY

## 2024-09-21 PROCEDURE — 160048 HCHG OR STATISTICAL LEVEL 1-5: Performed by: OBSTETRICS & GYNECOLOGY

## 2024-09-21 PROCEDURE — 160036 HCHG PACU - EA ADDL 30 MINS PHASE I: Performed by: OBSTETRICS & GYNECOLOGY

## 2024-09-21 PROCEDURE — 110371 HCHG SHELL REV 272: Performed by: OBSTETRICS & GYNECOLOGY

## 2024-09-21 PROCEDURE — 86780 TREPONEMA PALLIDUM: CPT

## 2024-09-21 PROCEDURE — 160046 HCHG PACU - 1ST 60 MINS PHASE II: Performed by: OBSTETRICS & GYNECOLOGY

## 2024-09-21 PROCEDURE — 86900 BLOOD TYPING SEROLOGIC ABO: CPT

## 2024-09-21 PROCEDURE — 85610 PROTHROMBIN TIME: CPT

## 2024-09-21 PROCEDURE — 700101 HCHG RX REV CODE 250: Performed by: OBSTETRICS & GYNECOLOGY

## 2024-09-21 PROCEDURE — 160047 HCHG PACU  - EA ADDL 30 MINS PHASE II: Performed by: OBSTETRICS & GYNECOLOGY

## 2024-09-21 PROCEDURE — 10D17ZZ EXTRACTION OF PRODUCTS OF CONCEPTION, RETAINED, VIA NATURAL OR ARTIFICIAL OPENING: ICD-10-PCS | Performed by: OBSTETRICS & GYNECOLOGY

## 2024-09-21 PROCEDURE — 160029 HCHG SURGERY MINUTES - 1ST 30 MINS LEVEL 4: Performed by: OBSTETRICS & GYNECOLOGY

## 2024-09-21 PROCEDURE — 59514 CESAREAN DELIVERY ONLY: CPT | Performed by: OBSTETRICS & GYNECOLOGY

## 2024-09-21 PROCEDURE — 700105 HCHG RX REV CODE 258: Performed by: NURSE PRACTITIONER

## 2024-09-21 PROCEDURE — 160035 HCHG PACU - 1ST 60 MINS PHASE I: Performed by: OBSTETRICS & GYNECOLOGY

## 2024-09-21 DEVICE — IMPLANTABLE DEVICE: Type: IMPLANTABLE DEVICE | Site: VAGINA | Status: FUNCTIONAL

## 2024-09-21 RX ORDER — HALOPERIDOL 5 MG/ML
1 INJECTION INTRAMUSCULAR
Status: DISCONTINUED | OUTPATIENT
Start: 2024-09-21 | End: 2024-09-21 | Stop reason: HOSPADM

## 2024-09-21 RX ORDER — ACETAMINOPHEN 500 MG
1000 TABLET ORAL EVERY 6 HOURS PRN
Status: DISCONTINUED | OUTPATIENT
Start: 2024-09-24 | End: 2024-09-23 | Stop reason: HOSPADM

## 2024-09-21 RX ORDER — SODIUM CHLORIDE, SODIUM LACTATE, POTASSIUM CHLORIDE, AND CALCIUM CHLORIDE .6; .31; .03; .02 G/100ML; G/100ML; G/100ML; G/100ML
1000 INJECTION, SOLUTION INTRAVENOUS ONCE
Status: COMPLETED | OUTPATIENT
Start: 2024-09-21 | End: 2024-09-21

## 2024-09-21 RX ORDER — HYDROMORPHONE HYDROCHLORIDE 1 MG/ML
0.1 INJECTION, SOLUTION INTRAMUSCULAR; INTRAVENOUS; SUBCUTANEOUS
Status: DISCONTINUED | OUTPATIENT
Start: 2024-09-21 | End: 2024-09-22 | Stop reason: HOSPADM

## 2024-09-21 RX ORDER — ALBUTEROL SULFATE 5 MG/ML
2.5 SOLUTION RESPIRATORY (INHALATION)
Status: DISCONTINUED | OUTPATIENT
Start: 2024-09-21 | End: 2024-09-22 | Stop reason: HOSPADM

## 2024-09-21 RX ORDER — MISOPROSTOL 200 UG/1
600 TABLET ORAL ONCE
Status: COMPLETED | OUTPATIENT
Start: 2024-09-21 | End: 2024-09-21

## 2024-09-21 RX ORDER — DIPHENHYDRAMINE HCL 25 MG
25 TABLET ORAL EVERY 6 HOURS PRN
Status: DISCONTINUED | OUTPATIENT
Start: 2024-09-22 | End: 2024-09-22

## 2024-09-21 RX ORDER — CEFAZOLIN SODIUM 1 G/3ML
INJECTION, POWDER, FOR SOLUTION INTRAMUSCULAR; INTRAVENOUS PRN
Status: DISCONTINUED | OUTPATIENT
Start: 2024-09-21 | End: 2024-09-21 | Stop reason: SURG

## 2024-09-21 RX ORDER — IBUPROFEN 800 MG/1
800 TABLET, FILM COATED ORAL EVERY 8 HOURS PRN
Status: DISCONTINUED | OUTPATIENT
Start: 2024-09-26 | End: 2024-09-21

## 2024-09-21 RX ORDER — HYDROMORPHONE HYDROCHLORIDE 1 MG/ML
0.2 INJECTION, SOLUTION INTRAMUSCULAR; INTRAVENOUS; SUBCUTANEOUS
Status: DISCONTINUED | OUTPATIENT
Start: 2024-09-21 | End: 2024-09-22 | Stop reason: HOSPADM

## 2024-09-21 RX ORDER — METOCLOPRAMIDE HYDROCHLORIDE 5 MG/ML
10 INJECTION INTRAMUSCULAR; INTRAVENOUS ONCE
Status: COMPLETED | OUTPATIENT
Start: 2024-09-21 | End: 2024-09-21

## 2024-09-21 RX ORDER — ALBUTEROL SULFATE 5 MG/ML
2.5 SOLUTION RESPIRATORY (INHALATION)
Status: DISCONTINUED | OUTPATIENT
Start: 2024-09-21 | End: 2024-09-21 | Stop reason: HOSPADM

## 2024-09-21 RX ORDER — OXYCODONE HCL 5 MG/5 ML
10 SOLUTION, ORAL ORAL
Status: DISCONTINUED | OUTPATIENT
Start: 2024-09-21 | End: 2024-09-21 | Stop reason: HOSPADM

## 2024-09-21 RX ORDER — HYDROMORPHONE HYDROCHLORIDE 1 MG/ML
0.4 INJECTION, SOLUTION INTRAMUSCULAR; INTRAVENOUS; SUBCUTANEOUS
Status: DISCONTINUED | OUTPATIENT
Start: 2024-09-21 | End: 2024-09-22 | Stop reason: HOSPADM

## 2024-09-21 RX ORDER — CITRIC ACID/SODIUM CITRATE 334-500MG
30 SOLUTION, ORAL ORAL ONCE
Status: COMPLETED | OUTPATIENT
Start: 2024-09-21 | End: 2024-09-21

## 2024-09-21 RX ORDER — DOCUSATE SODIUM 100 MG/1
100 CAPSULE, LIQUID FILLED ORAL 2 TIMES DAILY PRN
Status: DISCONTINUED | OUTPATIENT
Start: 2024-09-21 | End: 2024-09-23 | Stop reason: HOSPADM

## 2024-09-21 RX ORDER — SIMETHICONE 125 MG
125 TABLET,CHEWABLE ORAL 4 TIMES DAILY PRN
Status: DISCONTINUED | OUTPATIENT
Start: 2024-09-21 | End: 2024-09-23 | Stop reason: HOSPADM

## 2024-09-21 RX ORDER — HYDROMORPHONE HYDROCHLORIDE 1 MG/ML
0.2 INJECTION, SOLUTION INTRAMUSCULAR; INTRAVENOUS; SUBCUTANEOUS
Status: DISCONTINUED | OUTPATIENT
Start: 2024-09-21 | End: 2024-09-21 | Stop reason: HOSPADM

## 2024-09-21 RX ORDER — AZITHROMYCIN 500 MG/5ML
500 INJECTION, POWDER, LYOPHILIZED, FOR SOLUTION INTRAVENOUS ONCE
Status: COMPLETED | OUTPATIENT
Start: 2024-09-21 | End: 2024-09-21

## 2024-09-21 RX ORDER — SODIUM CHLORIDE, SODIUM GLUCONATE, SODIUM ACETATE, POTASSIUM CHLORIDE AND MAGNESIUM CHLORIDE 526; 502; 368; 37; 30 MG/100ML; MG/100ML; MG/100ML; MG/100ML; MG/100ML
INJECTION, SOLUTION INTRAVENOUS
Status: DISCONTINUED | OUTPATIENT
Start: 2024-09-21 | End: 2024-09-21 | Stop reason: SURG

## 2024-09-21 RX ORDER — DEXTROSE, SODIUM CHLORIDE, SODIUM LACTATE, POTASSIUM CHLORIDE, AND CALCIUM CHLORIDE 5; .6; .31; .03; .02 G/100ML; G/100ML; G/100ML; G/100ML; G/100ML
INJECTION, SOLUTION INTRAVENOUS CONTINUOUS
Status: DISCONTINUED | OUTPATIENT
Start: 2024-09-21 | End: 2024-09-21 | Stop reason: ALTCHOICE

## 2024-09-21 RX ORDER — ONDANSETRON 2 MG/ML
4 INJECTION INTRAMUSCULAR; INTRAVENOUS
Status: DISCONTINUED | OUTPATIENT
Start: 2024-09-21 | End: 2024-09-22 | Stop reason: HOSPADM

## 2024-09-21 RX ORDER — OXYCODONE HCL 5 MG/5 ML
10 SOLUTION, ORAL ORAL
Status: DISCONTINUED | OUTPATIENT
Start: 2024-09-21 | End: 2024-09-22 | Stop reason: HOSPADM

## 2024-09-21 RX ORDER — ACETAMINOPHEN 500 MG
1000 TABLET ORAL EVERY 6 HOURS
Status: DISCONTINUED | OUTPATIENT
Start: 2024-09-21 | End: 2024-09-23 | Stop reason: HOSPADM

## 2024-09-21 RX ORDER — ROPIVACAINE HYDROCHLORIDE 2 MG/ML
INJECTION, SOLUTION EPIDURAL; INFILTRATION; PERINEURAL
Status: COMPLETED
Start: 2024-09-21 | End: 2024-09-21

## 2024-09-21 RX ORDER — HYDROMORPHONE HYDROCHLORIDE 1 MG/ML
0.4 INJECTION, SOLUTION INTRAMUSCULAR; INTRAVENOUS; SUBCUTANEOUS
Status: DISCONTINUED | OUTPATIENT
Start: 2024-09-21 | End: 2024-09-21 | Stop reason: HOSPADM

## 2024-09-21 RX ORDER — OXYCODONE HYDROCHLORIDE 10 MG/1
10 TABLET ORAL EVERY 4 HOURS PRN
Status: DISCONTINUED | OUTPATIENT
Start: 2024-09-22 | End: 2024-09-23 | Stop reason: HOSPADM

## 2024-09-21 RX ORDER — IBUPROFEN 800 MG/1
800 TABLET, FILM COATED ORAL EVERY 8 HOURS
Status: DISCONTINUED | OUTPATIENT
Start: 2024-09-23 | End: 2024-09-21

## 2024-09-21 RX ORDER — ACETAMINOPHEN 500 MG
1000 TABLET ORAL EVERY 6 HOURS PRN
Status: DISCONTINUED | OUTPATIENT
Start: 2024-09-26 | End: 2024-09-21

## 2024-09-21 RX ORDER — OXYCODONE HCL 5 MG/5 ML
5 SOLUTION, ORAL ORAL
Status: DISCONTINUED | OUTPATIENT
Start: 2024-09-21 | End: 2024-09-22 | Stop reason: HOSPADM

## 2024-09-21 RX ORDER — SODIUM CHLORIDE, SODIUM LACTATE, POTASSIUM CHLORIDE, AND CALCIUM CHLORIDE .6; .31; .03; .02 G/100ML; G/100ML; G/100ML; G/100ML
250 INJECTION, SOLUTION INTRAVENOUS PRN
Status: DISCONTINUED | OUTPATIENT
Start: 2024-09-21 | End: 2024-09-21 | Stop reason: HOSPADM

## 2024-09-21 RX ORDER — KETOROLAC TROMETHAMINE 15 MG/ML
15 INJECTION, SOLUTION INTRAMUSCULAR; INTRAVENOUS EVERY 6 HOURS
Status: DISCONTINUED | OUTPATIENT
Start: 2024-09-22 | End: 2024-09-21

## 2024-09-21 RX ORDER — SODIUM CHLORIDE, SODIUM GLUCONATE, SODIUM ACETATE, POTASSIUM CHLORIDE AND MAGNESIUM CHLORIDE 526; 502; 368; 37; 30 MG/100ML; MG/100ML; MG/100ML; MG/100ML; MG/100ML
500 INJECTION, SOLUTION INTRAVENOUS CONTINUOUS
Status: DISCONTINUED | OUTPATIENT
Start: 2024-09-21 | End: 2024-09-23

## 2024-09-21 RX ORDER — ONDANSETRON 2 MG/ML
INJECTION INTRAMUSCULAR; INTRAVENOUS PRN
Status: DISCONTINUED | OUTPATIENT
Start: 2024-09-21 | End: 2024-09-21 | Stop reason: SURG

## 2024-09-21 RX ORDER — SODIUM CHLORIDE, SODIUM LACTATE, POTASSIUM CHLORIDE, AND CALCIUM CHLORIDE .6; .31; .03; .02 G/100ML; G/100ML; G/100ML; G/100ML
1000 INJECTION, SOLUTION INTRAVENOUS
Status: COMPLETED | OUTPATIENT
Start: 2024-09-21 | End: 2024-09-21

## 2024-09-21 RX ORDER — METHYLERGONOVINE MALEATE 0.2 MG/ML
0.2 INJECTION INTRAVENOUS ONCE
Status: COMPLETED | OUTPATIENT
Start: 2024-09-21 | End: 2024-09-21

## 2024-09-21 RX ORDER — PHENYLEPHRINE HCL IN 0.9% NACL 1 MG/10 ML
SYRINGE (ML) INTRAVENOUS PRN
Status: DISCONTINUED | OUTPATIENT
Start: 2024-09-21 | End: 2024-09-21 | Stop reason: SURG

## 2024-09-21 RX ORDER — DIPHENHYDRAMINE HYDROCHLORIDE 50 MG/ML
25 INJECTION INTRAMUSCULAR; INTRAVENOUS EVERY 6 HOURS PRN
Status: DISCONTINUED | OUTPATIENT
Start: 2024-09-22 | End: 2024-09-22

## 2024-09-21 RX ORDER — EPHEDRINE SULFATE 50 MG/ML
5 INJECTION, SOLUTION INTRAVENOUS
Status: DISCONTINUED | OUTPATIENT
Start: 2024-09-21 | End: 2024-09-21 | Stop reason: HOSPADM

## 2024-09-21 RX ORDER — OXYCODONE HCL 5 MG/5 ML
5 SOLUTION, ORAL ORAL
Status: DISCONTINUED | OUTPATIENT
Start: 2024-09-21 | End: 2024-09-21 | Stop reason: HOSPADM

## 2024-09-21 RX ORDER — SODIUM CHLORIDE, SODIUM GLUCONATE, SODIUM ACETATE, POTASSIUM CHLORIDE AND MAGNESIUM CHLORIDE 526; 502; 368; 37; 30 MG/100ML; MG/100ML; MG/100ML; MG/100ML; MG/100ML
500 INJECTION, SOLUTION INTRAVENOUS CONTINUOUS
Status: DISCONTINUED | OUTPATIENT
Start: 2024-09-21 | End: 2024-09-21 | Stop reason: ALTCHOICE

## 2024-09-21 RX ORDER — DIPHENHYDRAMINE HYDROCHLORIDE 50 MG/ML
12.5 INJECTION INTRAMUSCULAR; INTRAVENOUS
Status: DISCONTINUED | OUTPATIENT
Start: 2024-09-21 | End: 2024-09-22 | Stop reason: HOSPADM

## 2024-09-21 RX ORDER — SODIUM CHLORIDE, SODIUM GLUCONATE, SODIUM ACETATE, POTASSIUM CHLORIDE AND MAGNESIUM CHLORIDE 526; 502; 368; 37; 30 MG/100ML; MG/100ML; MG/100ML; MG/100ML; MG/100ML
1000 INJECTION, SOLUTION INTRAVENOUS ONCE
Status: DISCONTINUED | OUTPATIENT
Start: 2024-09-21 | End: 2024-09-21 | Stop reason: HOSPADM

## 2024-09-21 RX ORDER — OXYCODONE HYDROCHLORIDE 5 MG/1
5 TABLET ORAL EVERY 4 HOURS PRN
Status: DISCONTINUED | OUTPATIENT
Start: 2024-09-22 | End: 2024-09-23 | Stop reason: HOSPADM

## 2024-09-21 RX ORDER — ONDANSETRON 2 MG/ML
4 INJECTION INTRAMUSCULAR; INTRAVENOUS EVERY 6 HOURS PRN
Status: DISCONTINUED | OUTPATIENT
Start: 2024-09-22 | End: 2024-09-23 | Stop reason: HOSPADM

## 2024-09-21 RX ORDER — ACETAMINOPHEN 500 MG
1000 TABLET ORAL EVERY 6 HOURS
Status: DISCONTINUED | OUTPATIENT
Start: 2024-09-23 | End: 2024-09-21

## 2024-09-21 RX ORDER — HYDROMORPHONE HYDROCHLORIDE 1 MG/ML
0.1 INJECTION, SOLUTION INTRAMUSCULAR; INTRAVENOUS; SUBCUTANEOUS
Status: DISCONTINUED | OUTPATIENT
Start: 2024-09-21 | End: 2024-09-21 | Stop reason: HOSPADM

## 2024-09-21 RX ORDER — VITAMIN A ACETATE, BETA CAROTENE, ASCORBIC ACID, CHOLECALCIFEROL, .ALPHA.-TOCOPHEROL ACETATE, DL-, THIAMINE MONONITRATE, RIBOFLAVIN, NIACINAMIDE, PYRIDOXINE HYDROCHLORIDE, FOLIC ACID, CYANOCOBALAMIN, CALCIUM CARBONATE, FERROUS FUMARATE, ZINC OXIDE, CUPRIC OXIDE 3080; 12; 120; 400; 1; 1.84; 3; 20; 22; 920; 25; 200; 27; 10; 2 [IU]/1; UG/1; MG/1; [IU]/1; MG/1; MG/1; MG/1; MG/1; MG/1; [IU]/1; MG/1; MG/1; MG/1; MG/1; MG/1
1 TABLET, FILM COATED ORAL
Status: DISCONTINUED | OUTPATIENT
Start: 2024-09-21 | End: 2024-09-23 | Stop reason: HOSPADM

## 2024-09-21 RX ORDER — MORPHINE SULFATE 1 MG/ML
INJECTION, SOLUTION EPIDURAL; INTRATHECAL; INTRAVENOUS PRN
Status: DISCONTINUED | OUTPATIENT
Start: 2024-09-21 | End: 2024-09-21 | Stop reason: SURG

## 2024-09-21 RX ORDER — ROPIVACAINE HYDROCHLORIDE 2 MG/ML
INJECTION, SOLUTION EPIDURAL; INFILTRATION; PERINEURAL CONTINUOUS
Status: DISCONTINUED | OUTPATIENT
Start: 2024-09-21 | End: 2024-09-21 | Stop reason: ALTCHOICE

## 2024-09-21 RX ORDER — ONDANSETRON 4 MG/1
4 TABLET, ORALLY DISINTEGRATING ORAL EVERY 6 HOURS PRN
Status: DISCONTINUED | OUTPATIENT
Start: 2024-09-22 | End: 2024-09-23 | Stop reason: HOSPADM

## 2024-09-21 RX ORDER — LIDOCAINE HCL/EPINEPHRINE/PF 2%-1:200K
VIAL (ML) INJECTION PRN
Status: DISCONTINUED | OUTPATIENT
Start: 2024-09-21 | End: 2024-09-21 | Stop reason: SURG

## 2024-09-21 RX ORDER — ONDANSETRON 2 MG/ML
4 INJECTION INTRAMUSCULAR; INTRAVENOUS
Status: DISCONTINUED | OUTPATIENT
Start: 2024-09-21 | End: 2024-09-21 | Stop reason: HOSPADM

## 2024-09-21 RX ORDER — SODIUM CHLORIDE, SODIUM LACTATE, POTASSIUM CHLORIDE, CALCIUM CHLORIDE 600; 310; 30; 20 MG/100ML; MG/100ML; MG/100ML; MG/100ML
INJECTION, SOLUTION INTRAVENOUS PRN
Status: DISCONTINUED | OUTPATIENT
Start: 2024-09-21 | End: 2024-09-23 | Stop reason: HOSPADM

## 2024-09-21 RX ORDER — DIPHENHYDRAMINE HYDROCHLORIDE 50 MG/ML
12.5 INJECTION INTRAMUSCULAR; INTRAVENOUS
Status: DISCONTINUED | OUTPATIENT
Start: 2024-09-21 | End: 2024-09-21 | Stop reason: HOSPADM

## 2024-09-21 RX ORDER — KETOROLAC TROMETHAMINE 15 MG/ML
15 INJECTION, SOLUTION INTRAMUSCULAR; INTRAVENOUS ONCE
Status: COMPLETED | OUTPATIENT
Start: 2024-09-21 | End: 2024-09-21

## 2024-09-21 RX ORDER — DEXAMETHASONE SODIUM PHOSPHATE 4 MG/ML
INJECTION, SOLUTION INTRA-ARTICULAR; INTRALESIONAL; INTRAMUSCULAR; INTRAVENOUS; SOFT TISSUE PRN
Status: DISCONTINUED | OUTPATIENT
Start: 2024-09-21 | End: 2024-09-23 | Stop reason: HOSPADM

## 2024-09-21 RX ORDER — HALOPERIDOL 5 MG/ML
1 INJECTION INTRAMUSCULAR
Status: DISCONTINUED | OUTPATIENT
Start: 2024-09-21 | End: 2024-09-22 | Stop reason: HOSPADM

## 2024-09-21 RX ORDER — TRANEXAMIC ACID 100 MG/ML
INJECTION, SOLUTION INTRAVENOUS PRN
Status: DISCONTINUED | OUTPATIENT
Start: 2024-09-21 | End: 2024-09-21 | Stop reason: SURG

## 2024-09-21 RX ADMIN — Medication 300 MCG: at 21:27

## 2024-09-21 RX ADMIN — SODIUM CHLORIDE, POTASSIUM CHLORIDE, SODIUM LACTATE AND CALCIUM CHLORIDE 1000 ML: 600; 310; 30; 20 INJECTION, SOLUTION INTRAVENOUS at 08:30

## 2024-09-21 RX ADMIN — LIDOCAINE HYDROCHLORIDE AND EPINEPHRINE 5 ML: 20; 5 INJECTION, SOLUTION EPIDURAL; INFILTRATION; INTRACAUDAL; PERINEURAL at 15:59

## 2024-09-21 RX ADMIN — METOCLOPRAMIDE 10 MG: 5 INJECTION, SOLUTION INTRAMUSCULAR; INTRAVENOUS at 15:40

## 2024-09-21 RX ADMIN — Medication 1000 MCG: at 21:29

## 2024-09-21 RX ADMIN — ONDANSETRON 4 MG: 2 INJECTION INTRAMUSCULAR; INTRAVENOUS at 16:25

## 2024-09-21 RX ADMIN — TRANEXAMIC ACID 1000 MG: 100 INJECTION, SOLUTION INTRAVENOUS at 21:39

## 2024-09-21 RX ADMIN — LIDOCAINE HYDROCHLORIDE AND EPINEPHRINE 10 ML: 20; 5 INJECTION, SOLUTION EPIDURAL; INFILTRATION; INTRACAUDAL; PERINEURAL at 15:50

## 2024-09-21 RX ADMIN — FENTANYL CITRATE 50 MCG: 50 INJECTION, SOLUTION INTRAMUSCULAR; INTRAVENOUS at 20:50

## 2024-09-21 RX ADMIN — Medication 300 MCG: at 21:28

## 2024-09-21 RX ADMIN — FAMOTIDINE 20 MG: 10 INJECTION, SOLUTION INTRAVENOUS at 15:40

## 2024-09-21 RX ADMIN — PROPOFOL 150 MG: 10 INJECTION, EMULSION INTRAVENOUS at 21:27

## 2024-09-21 RX ADMIN — KETOROLAC TROMETHAMINE 15 MG: 15 INJECTION, SOLUTION INTRAMUSCULAR; INTRAVENOUS at 17:59

## 2024-09-21 RX ADMIN — Medication 200 MCG: at 21:54

## 2024-09-21 RX ADMIN — SODIUM CHLORIDE, POTASSIUM CHLORIDE, SODIUM LACTATE AND CALCIUM CHLORIDE: 600; 310; 30; 20 INJECTION, SOLUTION INTRAVENOUS at 02:30

## 2024-09-21 RX ADMIN — ROPIVACAINE HYDROCHLORIDE: 2 INJECTION EPIDURAL; INFILTRATION; PERINEURAL at 02:16

## 2024-09-21 RX ADMIN — OXYTOCIN 20 UNITS: 10 INJECTION, SOLUTION INTRAMUSCULAR; INTRAVENOUS at 16:23

## 2024-09-21 RX ADMIN — SODIUM CHLORIDE, SODIUM GLUCONATE, SODIUM ACETATE, POTASSIUM CHLORIDE AND MAGNESIUM CHLORIDE: 526; 502; 368; 37; 30 INJECTION, SOLUTION INTRAVENOUS at 15:55

## 2024-09-21 RX ADMIN — FENTANYL CITRATE 100 MCG: 50 INJECTION, SOLUTION INTRAMUSCULAR; INTRAVENOUS at 16:13

## 2024-09-21 RX ADMIN — MORPHINE SULFATE 1.5 MG: 1 INJECTION EPIDURAL; INTRATHECAL; INTRAVENOUS at 16:42

## 2024-09-21 RX ADMIN — ROPIVACAINE HYDROCHLORIDE: 2 INJECTION EPIDURAL; INFILTRATION; PERINEURAL at 09:51

## 2024-09-21 RX ADMIN — SODIUM CITRATE AND CITRIC ACID MONOHYDRATE 30 ML: 334; 500 SOLUTION ORAL at 15:40

## 2024-09-21 RX ADMIN — CEFAZOLIN 2 G: 1 INJECTION, POWDER, FOR SOLUTION INTRAMUSCULAR; INTRAVENOUS at 21:35

## 2024-09-21 RX ADMIN — CEFAZOLIN 2 G: 1 INJECTION, POWDER, FOR SOLUTION INTRAMUSCULAR; INTRAVENOUS at 16:05

## 2024-09-21 RX ADMIN — SODIUM CHLORIDE, SODIUM LACTATE, POTASSIUM CHLORIDE, CALCIUM CHLORIDE AND DEXTROSE MONOHYDRATE: 5; 600; 310; 30; 20 INJECTION, SOLUTION INTRAVENOUS at 08:10

## 2024-09-21 RX ADMIN — SODIUM CHLORIDE, SODIUM GLUCONATE, SODIUM ACETATE, POTASSIUM CHLORIDE AND MAGNESIUM CHLORIDE: 526; 502; 368; 37; 30 INJECTION, SOLUTION INTRAVENOUS at 21:41

## 2024-09-21 RX ADMIN — SODIUM CHLORIDE, POTASSIUM CHLORIDE, SODIUM LACTATE AND CALCIUM CHLORIDE: 600; 310; 30; 20 INJECTION, SOLUTION INTRAVENOUS at 15:06

## 2024-09-21 RX ADMIN — LIDOCAINE HYDROCHLORIDE AND EPINEPHRINE 5 ML: 20; 5 INJECTION, SOLUTION EPIDURAL; INFILTRATION; INTRACAUDAL; PERINEURAL at 16:03

## 2024-09-21 RX ADMIN — MISOPROSTOL 600 MCG: 200 TABLET ORAL at 20:14

## 2024-09-21 RX ADMIN — SODIUM CHLORIDE, SODIUM GLUCONATE, SODIUM ACETATE, POTASSIUM CHLORIDE AND MAGNESIUM CHLORIDE: 526; 502; 368; 37; 30 INJECTION, SOLUTION INTRAVENOUS at 21:17

## 2024-09-21 RX ADMIN — PHENYLEPHRINE HYDROCHLORIDE 1 MCG/KG/MIN: 10 INJECTION INTRAVENOUS at 16:31

## 2024-09-21 RX ADMIN — OXYTOCIN 20 UNITS: 10 INJECTION, SOLUTION INTRAMUSCULAR; INTRAVENOUS at 23:11

## 2024-09-21 RX ADMIN — DEXAMETHASONE SODIUM PHOSPHATE 4 MG: 4 INJECTION INTRA-ARTICULAR; INTRALESIONAL; INTRAMUSCULAR; INTRAVENOUS; SOFT TISSUE at 16:25

## 2024-09-21 RX ADMIN — Medication 500 MCG: at 21:30

## 2024-09-21 RX ADMIN — ONDANSETRON 4 MG: 2 INJECTION INTRAMUSCULAR; INTRAVENOUS at 21:35

## 2024-09-21 RX ADMIN — FENTANYL CITRATE 100 MCG: 50 INJECTION, SOLUTION INTRAMUSCULAR; INTRAVENOUS at 00:58

## 2024-09-21 RX ADMIN — Medication 100 MG: at 21:27

## 2024-09-21 RX ADMIN — OXYTOCIN 125 ML/HR: 10 INJECTION, SOLUTION INTRAMUSCULAR; INTRAVENOUS at 18:15

## 2024-09-21 RX ADMIN — SODIUM CHLORIDE, POTASSIUM CHLORIDE, SODIUM LACTATE AND CALCIUM CHLORIDE 1000 ML: 600; 310; 30; 20 INJECTION, SOLUTION INTRAVENOUS at 01:56

## 2024-09-21 RX ADMIN — AZITHROMYCIN 500 MG: 500 INJECTION, POWDER, LYOPHILIZED, FOR SOLUTION INTRAVENOUS at 15:40

## 2024-09-21 ASSESSMENT — PAIN DESCRIPTION - PAIN TYPE
TYPE: ACUTE PAIN
TYPE: SURGICAL PAIN
TYPE: ACUTE PAIN

## 2024-09-21 ASSESSMENT — PAIN SCALES - GENERAL
PAIN_LEVEL: 0
PAIN_LEVEL: 0

## 2024-09-21 NOTE — PROGRESS NOTES
1900 - Report received from BERTRAM Duenas. POC discussed.  used for interaction, pt okay with starting pitocin at this time.     0204 - Dr. Johnson at bedside.    0219 - Test dose for epidural placed without complications.     0650 - Report given to BERTRAM Smith. POC discussed.

## 2024-09-21 NOTE — PROGRESS NOTES
29 y.o. Estonian speaking  @ 39.4 sent over from clinic for IOL for PIH without severe features. Pt does not tolerate vaginal exams well. EFM & toco applied. Difficulty keeping baby on the monitor. Resident Dr. Beckham to bedside Language line ID [id number]  SVE unable to reach cervix.     Repeat SVE closed/50/-3/vretex/soft/ posterior    1800 Dr. Suazo to bedside. Discussed poc. Will start pitocin.    Report to NOC BERTRAM Lynn

## 2024-09-21 NOTE — ANESTHESIA PREPROCEDURE EVALUATION
Date: 09/21/24  Procedure: Labor Epidural         Relevant Problems   No relevant active problems       Physical Exam    Airway   Mallampati: II  TM distance: >3 FB  Neck ROM: full       Cardiovascular - normal exam  Rhythm: regular  Rate: normal  (-) murmur     Dental - normal exam           Pulmonary - normal exam  Breath sounds clear to auscultation     Abdominal    Neurological - normal exam                   Anesthesia Plan    ASA 2       Plan - epidural   Neuraxial block will be labor analgesia                  Pertinent diagnostic labs and testing reviewed    Informed Consent:    Anesthetic plan and risks discussed with patient.

## 2024-09-21 NOTE — PROGRESS NOTES
0700: Assumed care of patient, bedside report received from Violeta BURDEN. Updated on plan of care, call light within reach and fall precautions are in place and bed lock and in lowest position. Patient instructed to call for assistance before getting out of bed. All questions answered at this time. No other needs. Fetal monitoring in place, IUPC in place.        1520: Patient converted to Primary  per Dr. Ash.  1422: Delivery of Viable female infant.     1701: Patient entered PACU in apparent stable condition via gurney.     1805: Patient transported to [facility] in apparent stable condition. Report given to Violeta BURDEN. Bands verified x2.

## 2024-09-21 NOTE — PROGRESS NOTES
Called to room by RN to assess patients pushing efforts and fetal status.   Pushing efforts minimal.   FHT: baseline 180 with minimal variability and repetitive late decelerations- category 2.   TOCO: q 4 minutes  Cx: -1 station, complete. +caput and minimal descent with pushing efforts.   Discussed with patient my concern for fetal wellbeing and my recommendation to proceed with a  section. Discussed with the patient the benefits, risks and alternative of  delivery. Risks include but are not limited to pain, infection, bleeding with possible need for transfusion, scarring, damage to surrounding structures.  Specifically organs that can be damaged are bowel, bladder, ureters, and nerves. I also discussed the risk of emergency blood transfusion, rare risk of emergency hysterectomy or death.  Patient had the opportunity to ask questions. All questions were answered to the patient's satisfaction.  She reported understanding and signed consent.        SABA

## 2024-09-21 NOTE — PROGRESS NOTES
"S: Patient is comfortable with epidural. No complaints of pain or pressure. Is tolerating exams better. VSS, afebrile at this time. GBS negative. She is an IOL for PIH without severe features. Was noted to be closed on admit but jennifer too much. Was started on pitocin hoping to help her dilate enough for a balloon. She is currently on pitocin at 4mu/min. POC discussed, including SVE and possible balloon placement. She is agreeable to this. All questions answered.     O: /68   Pulse 96   Temp 36.7 °C (98 °F) (Temporal)   Resp 20   Ht 1.499 m (4' 11\")   Wt 73.5 kg (162 lb)   LMP 2023 (Approximate)   SpO2 95%   BMI 32.72 kg/m²            FHTs:  Baseline 135, variability: moderate, accels: present, decels: absent        [place]: Contractions q 2-4 minutes        SVE: 5/80/-1. No balloon placement    Discussed AROM and IUPC. She is agreeable. AROM with small amount of clear fluid. IUPC placed without incident.     A/P:    1.  IUP @ 39w5d  2.  Cat 1 FHTs    3.  S/P AROM and IUPC placement  4.  Anticipate     Luann Murillo CNM, APRN   "

## 2024-09-21 NOTE — CARE PLAN
The patient is Stable - Low risk of patient condition declining or worsening    Shift Goals  Clinical Goals: cervical change, category 1 fetal heart tracing  Patient Goals: healthy mom, healthy baby  Family Goals: support    Progress made toward(s) clinical / shift goals:    Problem: Knowledge Deficit - L&D  Goal: Patient and family/caregivers will demonstrate understanding of plan of care, disease process/condition, diagnostic tests and medications  Outcome: Progressing   Pt continually updated and educated on ongoing POC   Problem: Pain  Goal: Patient's pain will be alleviated or reduced to the patient’s comfort goal  Outcome: Progressing   Pt states she is experiencing adequate pain control

## 2024-09-21 NOTE — CARE PLAN
Problem: Knowledge Deficit - L&D  Goal: Patient and family/caregivers will demonstrate understanding of plan of care, disease process/condition, diagnostic tests and medications  Outcome: Progressing  Note: With  patient has been updated on plan of care. No questions at this time.      Problem: Risk for Fluid Imbalance  Goal: Patient's fluid volume balance will be maintained or improve  Outcome: Progressing  Note: IV fluids running, monitoring I/os     Problem: Pain  Goal: Patient's pain will be alleviated or reduced to the patient’s comfort goal  Outcome: Progressing  Note: Epidural in place patient educated on pain management.   The patient is Watcher - Medium risk of patient condition declining or worsening    Shift Goals  Clinical Goals: progression of labour  Patient Goals: healthy mom healthy baby  Family Goals: support    Progress made toward(s) clinical / shift goals:      Patient is not progressing towards the following goals:

## 2024-09-22 LAB
ERYTHROCYTE [DISTWIDTH] IN BLOOD BY AUTOMATED COUNT: 54.4 FL (ref 35.9–50)
HCT VFR BLD AUTO: 30.9 % (ref 37–47)
HGB BLD-MCNC: 10.5 G/DL (ref 12–16)
MCH RBC QN AUTO: 32.3 PG (ref 27–33)
MCHC RBC AUTO-ENTMCNC: 34 G/DL (ref 32.2–35.5)
MCV RBC AUTO: 95.1 FL (ref 81.4–97.8)
PLATELET # BLD AUTO: 144 K/UL (ref 164–446)
PMV BLD AUTO: 11.5 FL (ref 9–12.9)
RBC # BLD AUTO: 3.25 M/UL (ref 4.2–5.4)
WBC # BLD AUTO: 18.2 K/UL (ref 4.8–10.8)

## 2024-09-22 PROCEDURE — 700111 HCHG RX REV CODE 636 W/ 250 OVERRIDE (IP): Performed by: OBSTETRICS & GYNECOLOGY

## 2024-09-22 PROCEDURE — A9270 NON-COVERED ITEM OR SERVICE: HCPCS | Performed by: OBSTETRICS & GYNECOLOGY

## 2024-09-22 PROCEDURE — 700102 HCHG RX REV CODE 250 W/ 637 OVERRIDE(OP): Performed by: OBSTETRICS & GYNECOLOGY

## 2024-09-22 PROCEDURE — 770002 HCHG ROOM/CARE - OB PRIVATE (112)

## 2024-09-22 PROCEDURE — 700105 HCHG RX REV CODE 258: Performed by: OBSTETRICS & GYNECOLOGY

## 2024-09-22 RX ORDER — DIPHENHYDRAMINE HYDROCHLORIDE 50 MG/ML
25 INJECTION INTRAMUSCULAR; INTRAVENOUS EVERY 6 HOURS PRN
Status: DISCONTINUED | OUTPATIENT
Start: 2024-09-22 | End: 2024-09-23 | Stop reason: HOSPADM

## 2024-09-22 RX ORDER — DIPHENHYDRAMINE HCL 25 MG
25 TABLET ORAL EVERY 6 HOURS PRN
Status: DISCONTINUED | OUTPATIENT
Start: 2024-09-22 | End: 2024-09-23 | Stop reason: HOSPADM

## 2024-09-22 RX ADMIN — SODIUM CHLORIDE, POTASSIUM CHLORIDE, SODIUM LACTATE AND CALCIUM CHLORIDE: 600; 310; 30; 20 INJECTION, SOLUTION INTRAVENOUS at 03:08

## 2024-09-22 RX ADMIN — ACETAMINOPHEN 1000 MG: 500 TABLET ORAL at 06:40

## 2024-09-22 RX ADMIN — ACETAMINOPHEN 1000 MG: 500 TABLET ORAL at 12:32

## 2024-09-22 RX ADMIN — AMPICILLIN SODIUM 2000 MG: 2 INJECTION, POWDER, FOR SOLUTION INTRAVENOUS at 07:53

## 2024-09-22 RX ADMIN — DIPHENHYDRAMINE HYDROCHLORIDE 25 MG: 50 INJECTION, SOLUTION INTRAMUSCULAR; INTRAVENOUS at 04:06

## 2024-09-22 RX ADMIN — PRENATAL WITH FERROUS FUM AND FOLIC ACID 1 TABLET: 3080; 920; 120; 400; 22; 1.84; 3; 20; 10; 1; 12; 200; 27; 25; 2 TABLET ORAL at 07:49

## 2024-09-22 RX ADMIN — ACETAMINOPHEN 1000 MG: 500 TABLET ORAL at 18:01

## 2024-09-22 ASSESSMENT — PATIENT HEALTH QUESTIONNAIRE - PHQ9
SUM OF ALL RESPONSES TO PHQ9 QUESTIONS 1 AND 2: 0
1. LITTLE INTEREST OR PLEASURE IN DOING THINGS: NOT AT ALL
2. FEELING DOWN, DEPRESSED, IRRITABLE, OR HOPELESS: NOT AT ALL

## 2024-09-22 ASSESSMENT — PAIN DESCRIPTION - PAIN TYPE
TYPE: SURGICAL PAIN
TYPE: SURGICAL PAIN

## 2024-09-22 NOTE — ADDENDUM NOTE
Addendum  created 09/22/24 0534 by Clifton Bryant M.D.    Child order released for a procedure order, Clinical Note Signed, Intraprocedure Blocks edited, LDA created via procedure documentation, SmartForm saved

## 2024-09-22 NOTE — CARE PLAN
Problem: Altered Physiologic Condition  Goal: Patient physiologically stable as evidenced by normal lochia, palpable uterine involution and vitals within normal limits  Outcome: Progressing  FF@U, lochia scant to light, VSS     Problem: Respiratory/Oxygenation Function Post-Surgical  Goal: Patient will achieve/maintain normal respiratory rate/effort  Outcome: Progressing  Respiratory WDL   The patient is Stable - Low risk of patient condition declining or worsening    Shift Goals  Clinical Goals: VSS, FF, lochia WDL  Patient Goals: rest  Family Goals: support, rest    Progress made toward(s) clinical / shift goals:  VSS, FF@U, lochia scant to light. Pt resting, fob supportive

## 2024-09-22 NOTE — ANESTHESIA PREPROCEDURE EVALUATION
Case: [id number] Anesthesia Start Date/Time: 09/21/24 2117    Procedure: DILATION AND CURETTAGE (Vagina )    Anesthesia type: general    Pre-op diagnosis: Hemorrhage    Location: [facility] OR 03 / SURGERY LABOR AND DELIVERY    Surgeons: Arabella Ash D.O.            Relevant Problems   No relevant active problems     Post partum from C section today. Continuous bleeding over several hours. To OR for hemorrhage control.    Physical Exam    Anesthesia Plan    ASA 2- EMERGENT   ASA physical status emergent criteria: acute hemorrhage    Plan - general               Induction: intravenous      Pertinent diagnostic labs and testing reviewed    Informed Consent:    Anesthetic plan and risks discussed with patient.    Use of blood products discussed with: patient whom consented to blood products.

## 2024-09-22 NOTE — OP REPORT
OP Note    PreOp Diagnosis: 1. POD 0 sp LTCS for NRFHT and arrest of descent 2. Postpartum hemorrhage      PostOp Diagnosis: same      Procedure(s):  DILATION AND CURETTAGE    Surgeon(s):  Arabella Ash D.O.    Anesthesiologist/Type of Anesthesia:  Anesthesiologist: Clifton Bryant M.D./* No anesthesia type entered *    Surgical Staff:  Circulator: Nikki Alvarez R.N.  Scrub Person: Loly Byrd; Elvira Barth    Specimens removed if any:  none    Estimated Blood Loss: 200 making a total of 2100 cc EBL    Findings: blood clots removed from inside the uterus. Uterus intact.     Complications: none    Patient was taken to the OR and gnereal anesthesia was obtained. She was then prepped and draped in the normal sterile fashion in the dorsal lithotomy position. Clots were manually removed from the uterus until ultrasound demonstrated a thin stripe. The Jeannie was then inserted into the uterus and the cervical balloon was inflated with 120 cc of sterile saline. The Jeannie was connected to suction at 90 mmHg and blood was noted to be sucked into the tube but stopped short of entering the canister. Ultrasound was repeated and Jeannie placement was confirmed and again a thin stripe was appreciated. Small labial lacerations were noted bilaterally after the manual manipulation and they were repaired in the normal sterile fashion using a 3-0 vicryl. Count was correct. Patient was taken to recovery room awake and in stable condition.         9/21/2024 11:33 PM Arabella Ash D.O.

## 2024-09-22 NOTE — ANESTHESIA TIME REPORT
Anesthesia Start and Stop Event Times       Date Time Event    9/21/2024 0156 Ready for Procedure     0159 Anesthesia Start     1701 Anesthesia Stop          Responsible Staff  09/21/24      Name Role Begin End    Paul Johnson D.O. Anesth 0159 0726    Clifton Bryant M.D. Anesth 0726 1701          Overtime Reason:  no overtime (within assigned shift)    Comments:

## 2024-09-22 NOTE — PROGRESS NOTES
"Called to room by RN for bleeding.  Patient is approximately 5 hours postpartum and RN states that she will continue to have intermittent \"trickling\". We initially treated with buccal cytotec as methergine is contraindicated secondary to pre-eclampsia without severe features. Initially aptient responded well but continued to have trickling. She has now had a total EBL of approximately 900 on the postpartum unit which makes total EBL apprximately 1900 cc. Patient maintaining vitals currently but expresses pain with fundal rubs and given extreme vaginismus she is unwilling to let me try to perform a manual extraction of clots in the room. Therefore we discussed the option of proceeding to the operating room for EUA with likely curettage and insertion of Jeannie system for postpartum hemorrhage. CBC, fibrinogen, PT/INR, and COD ordered. Second IV to be started in the operating room. Plan to have ultrasound in the OR.   "

## 2024-09-22 NOTE — PROGRESS NOTES
Called Jefe to verify antibiotic order and med received and states she will call me back in a few minutes.

## 2024-09-22 NOTE — ANESTHESIA POSTPROCEDURE EVALUATION
Patient: Susan Coyle    Procedure Summary       Date: 09/21/24 Room / Location: [facility] OR 03 / SURGERY LABOR AND DELIVERY    Anesthesia Start: 2117 Anesthesia Stop: 2235    Procedure: DILATION AND CURETTAGE (Vagina ) Diagnosis: (Hemorrhage)    Surgeons: Arabella Ash D.O. Responsible Provider: Clifton Bryant M.D.    Anesthesia Type: general ASA Status: 2 - Emergent            Final Anesthesia Type: general  Last vitals  BP   Blood Pressure: 123/79    Temp   36.4 °C (97.6 °F)    Pulse   99   Resp   18    SpO2   94 %      Anesthesia Post Evaluation    Patient location during evaluation: PACU  Patient participation: complete - patient participated  Level of consciousness: awake and alert  Pain score: 0    Airway patency: patent  Anesthetic complications: no  Cardiovascular status: hemodynamically stable  Respiratory status: acceptable  Hydration status: euvolemic    PONV: none          No notable events documented.

## 2024-09-22 NOTE — ANESTHESIA TIME REPORT
Anesthesia Start and Stop Event Times       Date Time Event    9/21/2024 2116 Ready for Procedure     2117 Anesthesia Start     2235 Anesthesia Stop          Responsible Staff  09/21/24      Name Role Begin End    Clifton Bryant M.D. Anesth 2117 2235          Overtime Reason:  no overtime (within assigned shift)    Comments:

## 2024-09-22 NOTE — OR SURGEON
Immediate Post OP Note    PreOp Diagnosis: 1. IUP at 39w5d 2. Nonreassuring fetal heart tones 3. Arrest of descent      PostOp Diagnosis: same      Procedure(s):   SECTION, PRIMARY    Surgeon(s):  Arabella Ash D.O.    Anesthesiologist/Type of Anesthesia:  Anesthesiologist: Clifton Bryant M.D.; Paul Johnson D.O./Spinal    Surgical Staff:  Brigid Gastelum    Specimens removed if any:  Cord gasses and placenta     Estimated Blood Loss: 1000 cc    Findings: NEFG, viable female  delivered in the vertex presentation with apgars 9 and 9 and weight 3140 gms. Placenta appeared to have areas of port wine staining and dark red clotting noted upon removal of the placenta. Otherwise, normal appearing uterus, ovaries, and fallopian tubes.     Complications: none        2024 5:07 PM Arabella Ash D.O.

## 2024-09-22 NOTE — PROGRESS NOTES
0734 - Received report from Toyin BURDEN at change of shift. Assessment completed. Fundus firm and lochia WDL. Per Dr. Bender patient cleared to transfer to postpartum.    0830 - Patient transferred to postpartum in wheelchair. Report given to Olimpia BURDEN. Bands verified x2.    0945 - Notified Dr. Tate of ampicillin order and dose administered this morning. MD aware of dose administered with PCN allergy. Per Dr. Tate, discontinue ampicillin and no need to restart different antibiotic therapy at this time. Floor RN Olimpia notified.     1015 - Per Olimpia BURDEN, patient states that she believes she is allergic because she got rashes and swollen many years ago. Patient not currently complaining of any symptoms post ampicillin administration. Dr. Tate notified. No new orders given at this time.

## 2024-09-22 NOTE — ANESTHESIA PROCEDURE NOTES
Airway    Date/Time: 9/21/2024 9:28 PM    Performed by: Clifton Bryant M.D.  Authorized by: Clifton Bryant M.D.    Location:  OR  Urgency:  Elective  Difficult Airway: No    Indications for Airway Management:  Anesthesia      Spontaneous Ventilation: absent    Sedation Level:  Deep  Preoxygenated: Yes    Patient Position:  Sniffing  Mask Difficulty Assessment:  0 - not attempted  Final Airway Type:  Endotracheal airway  Final Endotracheal Airway:  ETT  Cuffed: Yes    Technique Used for Successful ETT Placement:  Video laryngoscopy  Devices/Methods Used in Placement:  Intubating stylet    Insertion Site:  Oral  Blade Type:  Glide  Laryngoscope Blade/Videolaryngoscope Blade Size:  3  ETT Size (mm):  6.5  Measured from:  Lips  ETT to Lips (cm):  21  Placement Verified by: capnometry    Cormack-Lehane Classification:  Grade I - full view of glottis  Number of Attempts at Approach:  1

## 2024-09-22 NOTE — PROGRESS NOTES
"1900- Received report from Violeta BURDEN. Assumed care. 12 hour chart check, MAR and orders reviewed.      2000- Assessment complete. Fundus firm one finger above umbilicus and palpable, lochia light rubra. Pain management and interventions discussed with pt. SO at bedside. POC discussed. All questions and concerns discussed. No further concerns.Discussed all of this with patient in Tongan.     2025- Patient pressed emergency button, when this RN got to bedside patient mentioned feeling like she was \"peeing\" a lot. This RN assessed patient and performed a fundal rub, patient had a steady trickle. Charge RN was called to bedside to assist with hemorrhaging patient and bolus of pitocin was started. Dr. Bender was then called and notified of patient's current state. Yulia ordered cytotec to be given and patient be re-assessed in an hour. Patient's bleeding subsided after cytotec was administered. Instructed patient to press emergency button if she feels a sudden gush again.       2058- Patient pressed emergency button again due to feeling a sudden gush of blood. This RN re-assessed patient's bleeding and determined that she was still actively hemorraghing. Dr Bender was called once again to inform her that patient was still bleeding. Yulia Small came to bedside and assessed patient. This RN translated for Dr. Bender and we discussed going back to OR for a EUA. Patient understood and agreed with the decision. Patient was then transferred back to L&D for procedure, she was accompanied by Nikki L&D RN  "

## 2024-09-22 NOTE — ANESTHESIA POSTPROCEDURE EVALUATION
Patient: Susan Coyle    Procedure Summary       Date: 24 Room / Location: [facility] OR 02 / SURGERY LABOR AND DELIVERY    Anesthesia Start: 159 Anesthesia Stop:     Procedure:  SECTION, PRIMARY (Abdomen) Diagnosis: (Fetal intolerance to labor)    Surgeons: Arabella Ash D.O. Responsible Provider: Clifton Bryant M.D.    Anesthesia Type: epidural ASA Status: 2            Final Anesthesia Type: epidural  Last vitals  BP   Blood Pressure: 127/58    Temp   36.2 °C (97.1 °F)    Pulse   (!) 111   Resp   18    SpO2   95 %      Anesthesia Post Evaluation    Patient location during evaluation: PACU  Patient participation: complete - patient participated  Level of consciousness: awake and alert  Pain score: 0    Airway patency: patent  Anesthetic complications: no  Cardiovascular status: hemodynamically stable  Respiratory status: acceptable  Hydration status: euvolemic    PONV: none          No notable events documented.     Nurse Pain Score: 2 (NPRS)

## 2024-09-22 NOTE — PROGRESS NOTES
0110- dr mathews updated on pt. Said melissa may be deflated.    Melissa deflated at 0120.     0238- melissa out. Fundus firm    0240- reported off to bettie.

## 2024-09-22 NOTE — PROGRESS NOTES
0240: Report from Nikki BURDEN. POC discussed, assumed care of pt.    0600: Pt calls RN reporting she is bleeding. RN at bedside. Small amount of blood on rosenda pad. Fundus firm, scant bleeding. Rosenda pad weighed to 10 cc.     0607: Dr. Mishra updated on pt's current status. MD will come to bedside to see pt soon.    0630: Dr. Mishra at bedside discussing POC with pt. This RN translating. Per Dr. Mishra, ok for pt to transfer to postpartum.    0700: Pt calls RN reporting she is bleeding. RN at bedside. Only small amount of blood seen on rosenda pad. Firm fundus, scant bleeding noted on vagina. Rosenda pad weighed 10 cc.    0715: Report to Jefe BURDEN. Care relinquished.

## 2024-09-22 NOTE — LACTATION NOTE
This note was copied from a baby's chart.  Initial Consult:    Pt is Sinhala speaking. Visit conducted via translation services from [employer] via iPad,  Mando [id number].     History:   Susan STUBBS, is a 30 y/o  s/p PC/S at 39+5 wks on 2024 at 1622. IOL for pre-eclampsia without severe features. C/S for fetal intolerance of labor and arrest of descent. PPH with D&C and Jeannie balloon, EBL over 2100 cc.     Baby girl had BW 3140 g (6 lbs, 14.8 oz).      History of BF:  Primip.     Report of Current Breastfeeding Status:  Susan plans to breast and bottle feed baby. Baby  x 2 since delivery, and has had some 15 mL formula feedings. Breasts appear soft/round/symmetrical. Nipples appear short-shanked/intact/pliable.     Baby girl can lift/cup/lateralize and extend tongue over lips. Organized suck to gloved finger. Baby had fed 15 mL 10 mins prior to LC visit.     Breastfeeding Assistance:     Encouraged frequent skin-to-skin.    Demonstrated and taught Susan how to perform hand expression.     Assisted Susan to position baby at the left breast in the laid back position.  Taught Susan to place baby tummy to tummy and nipple to nose, how to wedge her breast, and hand express to tease baby to a wide gape.      Infant fussy at first but rapidly fell asleep, likely d/t formula feeding prior to visit. No latch achieved this visit, also baby not due to feed.      Provided breastfeeding education on: skin to skin, supply and demand, hunger cues, frequency/duration of breastfeeds, cluster feeding, shallow vs deep latch.     [facility] Breastfeeding Resources handout provided and outpatient lactation care and support [place] options reviewed.     Lactancia materna breastfeeding booklet provided.    Susan does not have WIC, but desires it. [facility] WIC referral placed today. Encouraged her to make use of their lactation support services.      PLAN:    Skin to skin when either parent  awake and alert.    Offer breast whenever hunger cues noted. When combination feeding, always offer breast first.     If baby sleeps more than 3 hours, wake baby and offer breast.    If baby not waking for feeding at least every 3 hours, hand express and spoon/cup feed back EBM to baby.

## 2024-09-22 NOTE — OP REPORT
PreOp Diagnosis: 1. IUP at 39w5d 2. Nonreassuring fetal heart tones 3. Arrest of descent      PostOp Diagnosis: same      Procedure(s):   SECTION, PRIMARY    Surgeon(s):  Arabella Ash D.O.    Anesthesiologist/Type of Anesthesia:  Anesthesiologist: Clifton Bryant M.D.; Paul Johnson D.O./Spinal    Surgical Staff:  Brigid Gastelum    Specimens removed if any:  Cord gasses and placenta     Estimated Blood Loss: 1000 cc    Findings: NEFG, viable female  delivered in the vertex presentation with apgars 9 and 9 and weight 3140 gms. Placenta appeared to have areas of port wine staining and dark red clotting noted upon removal of the placenta. Otherwise, normal appearing uterus, ovaries, and fallopian tubes.     Complications: none        Condition: Mother and baby tolerated procedure well and was taken to the recovery room awake and in stable condition.    Procedure:     The patient was taken to the operating room where spinal anesthesia was found to be adequate.  She was then prepped and draped in the normal sterile fashion in the dorsal supine position with a leftward tilt.  A Pfannenstiel skin incision was then made with a scalpel and carried through to the underlying fascia.  The fascia was incised in the midline and the incision extended laterally with Geiger scissors.  The superior aspect of the fascial incision was then grasped with Jules clamps, elevated, and the underlying rectus muscles dissected off bluntly.  Attention was then turned to the inferior aspect of the fascia which in a similar fashion was grasped, tented up with Jules clamps, and the rectus muscles dissected off bluntly.  Rectus muscles were then  in the midline and the peritoneum identified tented up and entered sharply with the Metzenbaum scissors.  The peritoneal incision was then extended superiorly and inferiorly with good visualization of the bladder.The Jorge retractor was then placed. The bladder reflection was then  grasped with pickups and entered sharply with Metzenbaum scissors.  The incision was then extended and the bladder flap created digitally.  The lower uterine segment incised in a transverse fashion with the scalpel.  The uterine incision was then extended cephalad and caudad bluntly. The infant was then delivered atraumatically.  The nose and mouth were suctioned with bulb suction the cord doubly clamped and cut.  The infant was handed off to the waiting pediatricians.  The placenta was then removed with gentle traction and fundal massage.  The uterus was then cleared of all clots and debris.  The uterine incision was repaired with 0 Monocryl in a running locking fashion.  Hysterotomy was found to be hemostatic at this time.  The gutters were cleared of all clots.  The uterus was then returned to the abdomen.  The peritoneum was closed using 2-0 Monocryl. The fascia was reapproximated with 0 Vicryl in a running fashion.  The subcuticular space was closed using a 3-0 Monocryl.  The skin was then closed using a 4-0 Monocryl.  The patient tolerated the procedure well.  Sponge lap and needle counts were correct x2.  2 g of Ancef and 500 mg Zithromax were given prior to the procedure.  The patient was taken to the recovery room awake and in stable condition.        [facility]

## 2024-09-22 NOTE — PROGRESS NOTES
Obstetrics & Gynecology Post-Delivery Progress Note    Date of Service  2024    29 y.o.  1 s/p , Low Transverse  Delivery date: 24  Breastfeeding: Yes    Events  Delayed postpartum hemorrhage necessitating ta take back to the OR for anesthesia and manual extraction of blood clots and placement of Jeannie vacuum    Subjective  Pain: No  Bleeding: lochia minimal  PO's: taking clears  Voiding: cheng in place  Ambulating: no  Feeding: bottlefeeding    Objective  Temp:  [36.2 °C (97.1 °F)-36.9 °C (98.4 °F)] 36.7 °C (98.1 °F)  Pulse:  [] 99  Resp:  [16-19] 16  BP: (101-147)/(49-91) 112/64  SpO2:  [91 %-99 %] 94 %    Physical Exam  Exam:  Gen: A&Ox3 NAD  CV: no edema or cyanosis  Resp: normal effort  Abd: soft, NT, FF and below U  Ext: no edema or TTP  Psych: normal mood and affect      Lab Results   Component Value Date    RBC 3.25 (L) 2024    ABOGROUP O 2024    RH POS 2024     There is no immunization history for the selected administration types on file for this patient.    Assessment/Plan  Susan Coyle is a 29 y.o.  postop day 1 s/p , Low Transverse.    1. Post care: meeting all goals  2. Hemodynamics: stable  3. Pain: controlled  4. PNL:   Lab Results   Component Value Date    RH POS 2024    RUBELLAIGG 175.00 2024     5. Method of Feeding: plans to breastfeed  6. Method of Contraception:  not sexually active  7. Vaccinations: There is no immunization history for the selected administration types on file for this patient.  8. Disposition: likely home postop day 1    No new Assessment & Plan notes have been filed under this hospital service since the last note was generated.  Service: Obstetrics & Gynecology       VTE prophylaxis: SCDosmin Ash D.O.

## 2024-09-22 NOTE — PROGRESS NOTES
"Received patient to room S-329 via wheelchair.  Report received from Jefe KAHN RN and assumed care of patient.  Nursing assessment done.  She denies pain.  She was oriented to room, call light, infant security, emergency light, and unit routine using language line services on Jefe's phone.  Plan of care discussed.  Encouraged to call with any needs.  Two infant bands and cuddle system tag number checked for accuracy with \"Jefe \", L&D RN when transferred to postpartum at this time. FOB at bedside and supportive, also Sinhala speaking. Pt is returning to PP after change of shift hemorrhage on PP yesterday evening and post D&C, etc  Pt ambulated from wheelchair to bed, stood at bedside why we changed her gown and placed underwear with pads on. Pt then got in bed.  Encouraged ambulation throughout day, discussed importance and how it help help keep her fundus firm and bleeding lighter and verbalizes understanding through .  Postpartum education given in Sinhala , EPDS at bedside.  Instructed on filling out I&O sheet and diaper changes on clipboard using .  "

## 2024-09-23 ENCOUNTER — PHARMACY VISIT (OUTPATIENT)
Dept: PHARMACY | Facility: MEDICAL CENTER | Age: 29
End: 2024-09-23
Payer: COMMERCIAL

## 2024-09-23 VITALS
TEMPERATURE: 97.9 F | OXYGEN SATURATION: 94 % | RESPIRATION RATE: 20 BRPM | WEIGHT: 162 LBS | BODY MASS INDEX: 32.66 KG/M2 | SYSTOLIC BLOOD PRESSURE: 130 MMHG | DIASTOLIC BLOOD PRESSURE: 85 MMHG | HEART RATE: 95 BPM | HEIGHT: 59 IN

## 2024-09-23 LAB
BASOPHILS # BLD AUTO: 0.5 % (ref 0–1.8)
BASOPHILS # BLD: 0.06 K/UL (ref 0–0.12)
EOSINOPHIL # BLD AUTO: 0.19 K/UL (ref 0–0.51)
EOSINOPHIL NFR BLD: 1.6 % (ref 0–6.9)
ERYTHROCYTE [DISTWIDTH] IN BLOOD BY AUTOMATED COUNT: 56.5 FL (ref 35.9–50)
HCT VFR BLD AUTO: 27.7 % (ref 37–47)
HGB BLD-MCNC: 9.2 G/DL (ref 12–16)
IMM GRANULOCYTES # BLD AUTO: 0.12 K/UL (ref 0–0.11)
IMM GRANULOCYTES NFR BLD AUTO: 1 % (ref 0–0.9)
LYMPHOCYTES # BLD AUTO: 2.57 K/UL (ref 1–4.8)
LYMPHOCYTES NFR BLD: 21.6 % (ref 22–41)
MCH RBC QN AUTO: 32.2 PG (ref 27–33)
MCHC RBC AUTO-ENTMCNC: 33.2 G/DL (ref 32.2–35.5)
MCV RBC AUTO: 96.9 FL (ref 81.4–97.8)
MONOCYTES # BLD AUTO: 0.74 K/UL (ref 0–0.85)
MONOCYTES NFR BLD AUTO: 6.2 % (ref 0–13.4)
NEUTROPHILS # BLD AUTO: 8.22 K/UL (ref 1.82–7.42)
NEUTROPHILS NFR BLD: 69.1 % (ref 44–72)
NRBC # BLD AUTO: 0 K/UL
NRBC BLD-RTO: 0 /100 WBC (ref 0–0.2)
PATHOLOGY CONSULT NOTE: NORMAL
PLATELET # BLD AUTO: 140 K/UL (ref 164–446)
PMV BLD AUTO: 11.4 FL (ref 9–12.9)
RBC # BLD AUTO: 2.86 M/UL (ref 4.2–5.4)
WBC # BLD AUTO: 11.9 K/UL (ref 4.8–10.8)

## 2024-09-23 PROCEDURE — 36415 COLL VENOUS BLD VENIPUNCTURE: CPT

## 2024-09-23 PROCEDURE — A9270 NON-COVERED ITEM OR SERVICE: HCPCS | Performed by: OBSTETRICS & GYNECOLOGY

## 2024-09-23 PROCEDURE — 700102 HCHG RX REV CODE 250 W/ 637 OVERRIDE(OP): Performed by: OBSTETRICS & GYNECOLOGY

## 2024-09-23 PROCEDURE — 85025 COMPLETE CBC W/AUTO DIFF WBC: CPT

## 2024-09-23 PROCEDURE — RXMED WILLOW AMBULATORY MEDICATION CHARGE: Performed by: PHYSICIAN ASSISTANT

## 2024-09-23 RX ORDER — OXYCODONE HYDROCHLORIDE 5 MG/1
5 TABLET ORAL EVERY 4 HOURS PRN
Qty: 30 TABLET | Refills: 0 | Status: SHIPPED | OUTPATIENT
Start: 2024-09-23 | End: 2024-09-30

## 2024-09-23 RX ORDER — IBUPROFEN 800 MG/1
800 TABLET, FILM COATED ORAL EVERY 8 HOURS PRN
Qty: 30 TABLET | Refills: 0 | Status: SHIPPED | OUTPATIENT
Start: 2024-09-23

## 2024-09-23 RX ORDER — ACETAMINOPHEN 500 MG
TABLET ORAL
Qty: 30 TABLET | Refills: 0 | Status: SHIPPED | OUTPATIENT
Start: 2024-09-23 | End: 2024-11-22

## 2024-09-23 RX ORDER — PSEUDOEPHEDRINE HCL 30 MG
100 TABLET ORAL 2 TIMES DAILY PRN
Qty: 60 CAPSULE | Refills: 0 | Status: SHIPPED | OUTPATIENT
Start: 2024-09-23

## 2024-09-23 RX ADMIN — DOCUSATE SODIUM 100 MG: 100 CAPSULE, LIQUID FILLED ORAL at 09:41

## 2024-09-23 RX ADMIN — ACETAMINOPHEN 1000 MG: 500 TABLET ORAL at 12:22

## 2024-09-23 RX ADMIN — OXYCODONE HYDROCHLORIDE 10 MG: 10 TABLET ORAL at 09:41

## 2024-09-23 RX ADMIN — OXYCODONE 5 MG: 5 TABLET ORAL at 03:37

## 2024-09-23 RX ADMIN — ACETAMINOPHEN 1000 MG: 500 TABLET ORAL at 00:08

## 2024-09-23 ASSESSMENT — EDINBURGH POSTNATAL DEPRESSION SCALE (EPDS)
I HAVE BEEN SO UNHAPPY THAT I HAVE BEEN CRYING: NO, NEVER
I HAVE BLAMED MYSELF UNNECESSARILY WHEN THINGS WENT WRONG: NO, NEVER
I HAVE FELT SCARED OR PANICKY FOR NO GOOD REASON: NO, NOT AT ALL
I HAVE BEEN SO UNHAPPY THAT I HAVE HAD DIFFICULTY SLEEPING: YES, MOST OF THE TIME
I HAVE FELT SAD OR MISERABLE: NO, NOT AT ALL
THE THOUGHT OF HARMING MYSELF HAS OCCURRED TO ME: NEVER
I HAVE BEEN ANXIOUS OR WORRIED FOR NO GOOD REASON: NO, NOT AT ALL
THINGS HAVE BEEN GETTING ON TOP OF ME: NO, MOST OF THE TIME I HAVE COPED QUITE WELL
I HAVE LOOKED FORWARD WITH ENJOYMENT TO THINGS: AS MUCH AS I EVER DID
I HAVE BEEN ABLE TO LAUGH AND SEE THE FUNNY SIDE OF THINGS: AS MUCH AS I ALWAYS COULD

## 2024-09-23 ASSESSMENT — PAIN DESCRIPTION - PAIN TYPE
TYPE: SURGICAL PAIN

## 2024-09-23 NOTE — LACTATION NOTE
This note was copied from a baby's chart.  Mom is a 30 y/o P1 who delivered baby girl weighing 6 # 14.8 oz at 39.5 wks.  Mom reports that she had breast changes during pregnancy. Mom has been offering bottles because she feels she has no milk. LC discussed supply and demand and mom would like to combo feed. LC reviewed increasing her milk supply by breast feeding baby first then offering a supplement if medically indicated.   LC was unable to hand express any colsotrum bilaterally. LC offered to assist in putting baby to breast and mother accepted.   Baby latched well on left side in FB hold. Baby became fussy and appeared frantic after few minutes. LC dripped formula onto nipple to entice baby. After another minute baby pulled off and fussed. LC gave baby 10 mls of ABM and settled baby and place back to breast.   LC reviewed supply and demand if wanting to combo feed.  Mom verbally understands education and FOB is at bedside. In house  Sarah assisted with teaching.   LC explained supplemental guidelines to offer baby after feedings. Mom does have a pump at home and is enrolled in WIC.

## 2024-09-23 NOTE — DISCHARGE PLANNING
:    Received consult: Please assess maternal fetal bonding     Discussed Pt with Sebastian.  Per RN, Pt had a traumatic delivery and had post partum hemorrhage after .  Per RN, parents have been appropriate and are bonding and caring for infant on post partum.  Pt and baby will be ready for discharge today.      Discussed with RN and  intervention is not needed.

## 2024-09-23 NOTE — PROGRESS NOTES
1900- Received report from Olimpia BURDEN. Assumed care. 12 hour chart check, MAR and orders reviewed.      2030- Assessment complete. Fundus firm and palpable, lochia scant rubra. Pain management and interventions discussed with pt. SO at bedside. POC discussed. All questions and concerns discussed. No further concerns.

## 2024-09-23 NOTE — DISCHARGE INSTRUCTIONS

## 2024-09-23 NOTE — DISCHARGE SUMMARY
Discharge Summary:      Susan Coyle    Admit Date:   2024  Discharge Date:  2024     Admitting diagnosis:  Encounter for induction of labor [Z34.90]  Labor and delivery indication for care or intervention [O75.9]  Discharge Diagnosis: Status post  for fetal distress, failure to progress.  Pregnancy Complications: PPH  Tubal Ligation:  no        History:  History reviewed. No pertinent past medical history.  OB History    Para Term  AB Living   1 1 1     1   SAB IAB Ectopic Molar Multiple Live Births           0 1      # Outcome Date GA Lbr Brayan/2nd Weight Sex Type Anes PTL Lv   1 Term 24 39w5d 09:45 / 01:57 3.14 kg (6 lb 14.8 oz) F CS-LTranv EPI N BROOKE      Complications: Dysfunctional Labor        Penicillins  Patient Active Problem List    Diagnosis Date Noted    Postpartum care following  delivery 2024    Elevated blood pressure affecting pregnancy in third trimester, antepartum 2024    Encounter for induction of labor 2024    Labor and delivery indication for care or intervention 2024    Supervision of first pregnancy 2024    History of childhood asthma 2024    Vulvodynia 2024        Hospital Course:   29 y.o. , now para 1, was admitted with the above mentioned diagnosis, underwent Induction of Labor,  for fetal distress, failure to progress. Patient postpartum course was unremarkable, with progressive advancement in diet , ambulation and toleration of oral analgesia. Patient without complaints today and desires discharge.      Vitals:    24 1640 24 1821 24 2200 24 0600   BP: 106/57 107/66 105/64 109/75   Pulse: 84 82 67 75   Resp: 18 18 18 16   Temp: 36.3 °C (97.4 °F) 36.5 °C (97.7 °F) 36.7 °C (98.1 °F) 36.7 °C (98 °F)   TempSrc: Temporal Temporal Temporal Temporal   SpO2: 95% 95% 96% 93%   Weight:       Height:           Current Facility-Administered Medications    Medication Dose    diphenhydrAMINE (Benadryl) tablet/capsule 25 mg  25 mg    Or    diphenhydrAMINE (Benadryl) injection 25 mg  25 mg    lactated ringers infusion      oxyCODONE immediate-release (Roxicodone) tablet 5 mg  5 mg    oxyCODONE immediate release (Roxicodone) tablet 10 mg  10 mg    ondansetron (Zofran) syringe/vial injection 4 mg  4 mg    Or    ondansetron (Zofran ODT) dispertab 4 mg  4 mg    docusate sodium (Colace) capsule 100 mg  100 mg    tetanus-dipth-acell pertussis (Tdap) inj 0.5 mL  0.5 mL    simethicone (Mylicon) chewable tablet 125 mg  125 mg    prenatal plus vitamin (Stuartnatal 1+1) 27-1 MG tablet 1 Tablet  1 Tablet    acetaminophen (Tylenol) tablet 1,000 mg  1,000 mg    Followed by    [START ON 9/24/2024] acetaminophen (Tylenol) tablet 1,000 mg  1,000 mg    electrolyte-A (Plasmalyte-A) infusion 500 mL  500 mL     Facility-Administered Medications Ordered in Other Encounters   Medication Dose    dexamethasone (Decadron) injection         Exam:  Breast Exam: negative  Abdomen: Abdomen soft, non-tender. BS normal. No masses,  No organomegaly  Fundus Non Tender: yes  Incision: no evidence of infection, separation or keloid formation. Mepilex in place  Perineum: perineum intact  Extremity: extremities, peripheral pulses and reflexes normal     Labs:  Recent Labs     09/21/24  2130 09/21/24  2358 09/23/24  0455   WBC 23.5* 18.2* 11.9*   RBC 4.10* 3.25* 2.86*   HEMOGLOBIN 13.0 10.5* 9.2*   HEMATOCRIT 39.3 30.9* 27.7*   MCV 95.9 95.1 96.9   MCH 31.7 32.3 32.2   MCHC 33.1 34.0 33.2   RDW 55.9* 54.4* 56.5*   PLATELETCT 173 144* 140*   MPV 11.3 11.5 11.4        Activity:   Discharge to home  Pelvic Rest x 6 weeks    Assessment:  normal postpartum course  Discharge Assessment: No areas of skin breakdown/redness; surgical incision intact/healing     [facility] ; 1 week for incision check.      Discharge Meds:   Current Outpatient Medications    Medication Sig Dispense Refill    acetaminophen (TYLENOL) 500 MG Tab Take 2 Tablets by mouth every 6 hours for 30 days, THEN 2 Tablets every 6 hours as needed for Mild Pain for up to 30 days. 30 Tablet 0    docusate sodium 100 MG Cap Take 100 mg by mouth 2 times a day as needed for Constipation. 60 Capsule 0    oxyCODONE immediate-release (ROXICODONE) 5 MG Tab Take 1 Tablet by mouth every four hours as needed for Severe Pain for up to 7 days. 30 Tablet 0    ibuprofen (MOTRIN) 800 MG Tab Take 1 Tablet by mouth every 8 hours as needed for Mild Pain. 30 Tablet 0       Tamsen CARMEN Villareal, P.A.

## 2024-09-23 NOTE — PROGRESS NOTES
0940- patient assessment done.  Condition will continue to be monitored.     1525- patient states that she understands all discharge instructions and has no questions at this time.

## 2024-09-23 NOTE — CARE PLAN
The patient is Stable - Low risk of patient condition declining or worsening    Shift Goals  Clinical Goals: Continue mobilization, firm fundus. normal lochia  Patient Goals: rest  Family Goals: support, rest    Progress made toward(s) clinical / shift goals:    Problem: Knowledge Deficit - Postpartum  Goal: Patient will verbalize and demonstrate understanding of self and infant care  Outcome: Progressing     Problem: Psychosocial - Postpartum  Goal: Patient will verbalize and demonstrate effective bonding and parenting behavior  Outcome: Progressing     Problem: Early Mobilization - Post Surgery  Goal: Early mobilization post surgery  Outcome: Progressing

## 2024-09-23 NOTE — ANESTHESIA PROCEDURE NOTES
Epidural Block    Date/Time: 9/21/2024 2:11 AM    Performed by: Paul Johnson D.O.  Authorized by: Clifton Bryant M.D.    Patient Location:  OB  Start Time:  9/21/2024 2:11 AM  End Time:  9/21/2024 2:20 AM  Reason for Block: labor analgesia    patient identified, IV checked, site marked, risks and benefits discussed, surgical consent, monitors and equipment checked and pre-op evaluation    Patient Position:  Sitting  Prep: ChloraPrep, patient draped and sterile technique    Monitoring:  Blood pressure, continuous pulse oximetry and heart rate  Approach:  Midline  Location:  L4-L5  Injection Technique:  AILYN air and AILYN saline  Skin infiltration:  Lidocaine  Strength:  1%  Dose:  3ml  Needle Type:  Tuohy  Needle Gauge:  17 G  Needle Length:  3.5 in  Loss of resistance::  6  Catheter Size:  19 G  Catheter at Skin Depth:  14  Test Dose Result:  Negative

## 2024-09-25 LAB — COMPONENT CELLULAR 8504CLL: NORMAL

## 2024-10-01 ENCOUNTER — APPOINTMENT (OUTPATIENT)
Dept: OBGYN | Facility: CLINIC | Age: 29
End: 2024-10-01

## 2024-10-01 VITALS — DIASTOLIC BLOOD PRESSURE: 60 MMHG | SYSTOLIC BLOOD PRESSURE: 104 MMHG | WEIGHT: 134 LBS | BODY MASS INDEX: 27.06 KG/M2

## 2024-10-01 DIAGNOSIS — Z09 SURGERY FOLLOW-UP: ICD-10-CM

## 2024-10-01 PROCEDURE — 99024 POSTOP FOLLOW-UP VISIT: CPT | Performed by: OBSTETRICS & GYNECOLOGY

## 2024-10-01 PROCEDURE — 3074F SYST BP LT 130 MM HG: CPT | Performed by: OBSTETRICS & GYNECOLOGY

## 2024-10-01 PROCEDURE — 3078F DIAST BP <80 MM HG: CPT | Performed by: OBSTETRICS & GYNECOLOGY

## 2024-10-01 ASSESSMENT — FIBROSIS 4 INDEX: FIB4 SCORE: 1.6

## 2024-10-29 ENCOUNTER — POST PARTUM (OUTPATIENT)
Dept: OBGYN | Facility: CLINIC | Age: 29
End: 2024-10-29
Payer: MEDICAID

## 2024-10-29 VITALS — SYSTOLIC BLOOD PRESSURE: 100 MMHG | WEIGHT: 132 LBS | BODY MASS INDEX: 26.66 KG/M2 | DIASTOLIC BLOOD PRESSURE: 52 MMHG

## 2024-10-29 PROBLEM — Z34.00 PREGNANCY, SUPERVISION OF FIRST: Status: RESOLVED | Noted: 2024-03-13 | Resolved: 2024-10-29

## 2024-10-29 PROBLEM — O16.3 ELEVATED BLOOD PRESSURE AFFECTING PREGNANCY IN THIRD TRIMESTER, ANTEPARTUM: Status: RESOLVED | Noted: 2024-09-20 | Resolved: 2024-10-29

## 2024-10-29 PROBLEM — Z34.90 ENCOUNTER FOR INDUCTION OF LABOR: Status: RESOLVED | Noted: 2024-09-20 | Resolved: 2024-10-29

## 2024-10-29 PROBLEM — N94.819 VULVODYNIA: Status: RESOLVED | Noted: 2024-03-13 | Resolved: 2024-10-29

## 2024-10-29 ASSESSMENT — FIBROSIS 4 INDEX: FIB4 SCORE: 1.6

## (undated) DEVICE — SUTURE 4-0 MONOCRYL PLUS PS-1 - 27 INCH (36/BX)

## (undated) DEVICE — TUBING SCT 18IN BR SFTCH BTL - 10/BX GOES WITH 77350

## (undated) DEVICE — RETRACTOR O C SECTION X-LRY - (5/BX)

## (undated) DEVICE — VACURETTE 14MM CURVED 1/2IN BASE (10EA/PK)

## (undated) DEVICE — SET EXTENSION WITH 2 PORTS (48EA/CA) ***PART #2C8610 IS A SUBSTITUTE*****

## (undated) DEVICE — GLOVE BIOGEL INDICATOR SZ 6 SURGICAL PF LTX -(50/BX)

## (undated) DEVICE — NEEDLE SAFETY 25 GA X 5/8 IN LL HYPO (100/BX 12BX/CA) WAS #6351

## (undated) DEVICE — GLOVE BIOGEL SZ 6 PF LATEX - (50EA/BX 4BX/CA)

## (undated) DEVICE — VACURETTE 'F' TIP 8MM 10/PKG

## (undated) DEVICE — BOTTLE SECONDARY SAFE TOUCH - W/SEAL CUP(10/BX)

## (undated) DEVICE — CURRETTE TIP VAC CURV 16MM (10EA/BX)

## (undated) DEVICE — PLUMEPEN ULTRA 3/8 IN X 10 FT HOSE (20EA/CA)

## (undated) DEVICE — PACK ROOM TURNOVER L&D (12/CA)

## (undated) DEVICE — TUBING D & E COLLECTION SET (50EA/PK)

## (undated) DEVICE — ADHESIVE MASTISOL - (48/BX)

## (undated) DEVICE — RETRACTOR O C SECTION LRY - (5/BX)

## (undated) DEVICE — SUTURE ABSORBABLE MONOFILAMENT VIOLET MONOCRYL CT-1 L36 IN (36EA/BX)

## (undated) DEVICE — WATER IRRIGATION STERILE 1000ML (12EA/CA)

## (undated) DEVICE — TUBING CLEARLINK DUO-VENT - C-FLO (48EA/CA)

## (undated) DEVICE — VACURETTE 8MM CURVED 10/PKG

## (undated) DEVICE — SOLUTION PLASMA-LYTE PH 7.4 INJ 1000ML (14EA/CA)

## (undated) DEVICE — CATHETER IV NON-SAFETY 18 GA X 1 1/4 (50/BX 4BX/CA)

## (undated) DEVICE — VACURETTE 12MM CURVED 10/PKG

## (undated) DEVICE — DRESSING POST OP BORDER 4 X 10 (5EA/BX)

## (undated) DEVICE — SLEEVE, SEQUENTIAL CALF REG

## (undated) DEVICE — TRAY SPINAL ANESTHESIA NON-SAFETY (10/CA)

## (undated) DEVICE — CHLORAPREP 26 ML APPLICATOR - ORANGE TINT(25/CA)

## (undated) DEVICE — SUTURE 2-0 MONOCRYL CT-1

## (undated) DEVICE — PACK C-SECTION (2EA/CA)

## (undated) DEVICE — TRAY SRGPRP PVP IOD WT PRP - (20/CA)

## (undated) DEVICE — SUTURE 0 VICRYL PLUS CT-1 - 36 INCH (36/BX)

## (undated) DEVICE — PAD LAP STERILE 18 X 18 - (5/PK 40PK/CA)

## (undated) DEVICE — LACTATED RINGERS INJ 1000 ML - (14EA/CA 60CA/PF)

## (undated) DEVICE — VACURETTE 10MM CURVED 10/PKG

## (undated) DEVICE — HEAD HOLDER JUNIOR/ADULT

## (undated) DEVICE — BOTTLE COLLECTION CANISTER LG - (10/PK)

## (undated) DEVICE — SODIUM CHL IRRIGATION 0.9% 1000ML (12EA/CA)

## (undated) DEVICE — CLOSURE SKIN STRIP 1/2 X 4 IN - (STERI STRIP) (50/BX 4BX/CA)

## (undated) DEVICE — BLANKET WARMING LOWER BODY (10EA/CA)

## (undated) DEVICE — TRAP TISSUE SAFETOUCH

## (undated) DEVICE — CANISTER SUCTION 3000ML MECHANICAL FILTER AUTO SHUTOFF MEDI-VAC NONSTERILE LF DISP (40EA/CA)

## (undated) DEVICE — SUTURE 3-0 VICRYL PLUS CT-1 - 36 INCH (36/BX)

## (undated) DEVICE — SUTURE 3-0 MONOCRYL CT-1 ABS - 36IN (36/BX)

## (undated) DEVICE — KIT  I.V. START (100EA/CA)